# Patient Record
Sex: MALE | Race: BLACK OR AFRICAN AMERICAN | NOT HISPANIC OR LATINO | URBAN - METROPOLITAN AREA
[De-identification: names, ages, dates, MRNs, and addresses within clinical notes are randomized per-mention and may not be internally consistent; named-entity substitution may affect disease eponyms.]

---

## 2017-07-08 ENCOUNTER — EMERGENCY (EMERGENCY)
Facility: HOSPITAL | Age: 47
LOS: 1 days | Discharge: HOME | End: 2017-07-08

## 2018-01-15 NOTE — PROGRESS NOTES
Assessment    1  Encounter for preventive health examination (V70 0) (Z00 00)    Plan  Anxiety    · ClonazePAM 1 MG Oral Tablet; TAKE 1 TABLET TWICE DAILY AS NEEDED  Benign essential hypertension    · Lisinopril 2 5 MG Oral Tablet; take 1 tablet every day   · Metoprolol Succinate ER 25 MG Oral Tablet Extended Release 24 Hour; TAKE 1  TABLET ONCE DAILY  Insomnia    · Zolpidem Tartrate ER 12 5 MG Oral Tablet Extended Release; TAKE 1 TABLET  AT BEDTIME    Discussion/Summary  Impression: health maintenance visit  Currently, he eats a healthy diet  Prostate cancer screening: PSA is not indicated  Testicular cancer screening: monthly self testicular exam was advised  Colorectal cancer screening: colorectal cancer screening is not indicated  Advice and education were given regarding cardiovascular risk reduction and tobacco cessation  Patient discussion: discussed with the patient  Pt is to follow up in one year  He will send for records when he finds physician  The patent has the current Barriers: None  Patient is able to Self-Care  Possible side effects of new medications were reviewed with the patient/guardian today  The treatment plan was reviewed with the patient/guardian  The patient/guardian understands and agrees with the treatment plan   The patient was counseled regarding instructions for management, risk factor reductions, patient and family education, impressions, risks and benefits of treatment options, importance of compliance with treatment  Self Referrals: No      Chief Complaint  Well visit and work PE  History of Present Illness  HM, Adult Male: The patient is being seen for a health maintenance evaluation  The last health maintenance visit was 1 year(s) ago  Social History: Household members include parents  He is   Work status: working full time  The patient is a current cigarette smoker  He reports occasional alcohol use  He has never used illicit drugs     General Health: The patient's health since the last visit is described as good  He has regular dental visits  He complains of vision problems  Vision care includes wearing glasses and having regular eye examinations  He denies hearing loss  Immunizations status: up to date  Lifestyle:  He consumes a diverse and healthy diet  He does not have any weight concerns  He does not exercise regularly  He uses tobacco  He denies alcohol use  He denies drug use  Screening:   HPI: 42year old male is here for check up  He is not having any problems at this time  He is taking medications as directed and needs them renewed  He is going to be working in [place] and 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  Review of Systems    Constitutional: No fever or chills, feels well, no tiredness, no recent weight gain or weight loss  Eyes: No complaints of eye pain, no red eyes, no discharge from eyes, no itchy eyes  ENT: no complaints of earache, no hearing loss, no nosebleeds, no nasal discharge, no sore throat, no hoarseness  Cardiovascular: No complaints of slow heart rate, no fast heart rate, no chest pain, no palpitations, no leg claudication, no lower extremity  Respiratory: No complaints of shortness of breath, no wheezing, no cough, no SOB on exertion, no orthopnea or PND  Gastrointestinal: No complaints of abdominal pain, no constipation, no nausea or vomiting, no diarrhea or bloody stools  Genitourinary: No complaints of dysuria, no incontinence, no hesitancy, no nocturia, no genital lesion, no testicular pain  Musculoskeletal: No complaints of arthralgia, no myalgias, no joint swelling or stiffness, no limb pain or swelling  Integumentary: No complaints of skin rash or skin lesions, no itching, no skin wound, no dry skin  Neurological: No compliants of headache, no confusion, no convulsions, no numbness or tingling, no dizziness or fainting, no limb weakness, no difficulty walking  Psychiatric: as noted in HPI  Endocrine: No complaints of proptosis, no hot flashes, no muscle weakness, no erectile dysfunction, no deepening of the voice, no feelings of weakness  Hematologic/Lymphatic: No complaints of swollen glands, no swollen glands in the neck, does not bleed easily, no easy bruising  ROS reviewed  Active Problems    1  Anxiety (300 00) (F41 9)   2  Benign essential hypertension (401 1) (I10)   3  Degeneration, intervertebral disc, cervical (722 4) (M50 30)   4  Depression (311) (F32 9)   5  Insomnia (780 52) (G47 00)    Past Medical History    · History of Back pain (724 5) (M54 9)   · History of Chest pain (786 50) (R07 9)   · History of ECG (V15 89) (Z92 89)   · History of stress test (V15 89) (Z92 89)   · History of Muscle spasm (728 85) (R01 041)   · History of Pain in joint, shoulder region (719 41) (M25 519)   · History of Palpitations (785 1) (R00 2)    Surgical History    · History of Knee Surgery    Family History  Father    · Family history of Colon cancer  Grandfather    · Family history of Colon cancer   · Family history of Diabetes    Social History    · Denied: History of Alcohol use   · Denied: History of Drug use   · Heavy smoker (more than 20 cigarettes per day) (305 1) (F17 210)   · Tobacco use (305 1) (Z72 0)    Current Meds   1  ClonazePAM 1 MG Oral Tablet; TAKE 1 TABLET TWICE DAILY AS NEEDED; Therapy: 84XMS0980 to (Evaluate:25Xmn2236); Last Rx:[id number] Ordered   2  Lisinopril 2 5 MG Oral Tablet; take 1 tablet every day  Requested for: 35BPX0692; Last   Rx:[id number] Ordered   3  Metoprolol Succinate ER 25 MG Oral Tablet Extended Release 24 Hour; TAKE 1 TABLET   ONCE DAILY  Requested for: 78IBD6843; Last Rx:[id number] Ordered   4  Zolpidem Tartrate ER 12 5 MG Oral Tablet Extended Release; TAKE 1 TABLET AT   BEDTIME; Therapy: 65DXM0587 to (Tammy Lindquist)  Requested for: 98QBZ7846; Last   Rx:[id number] Ordered    Allergies    1   Penicillins    Vitals   Recorded: 70Dvr0835 10:38AM Recorded: 68Uzp5564 10:25AM   Heart Rate  84   Systolic 492 087   Diastolic 78 94   Height  5 ft 10 in   Weight  214 lb    BMI Calculated  30 71   BSA Calculated  2 15   O2 Saturation  97     Physical Exam    Constitutional   General appearance: No acute distress, well appearing and well nourished  Eyes   Conjunctiva and lids: No swelling, erythema, or discharge  Pupils and irises: Equal, round and reactive to light  Ears, Nose, Mouth, and Throat   External inspection of ears and nose: Normal     Otoscopic examination: Tympanic membrance translucent with normal light reflex  Canals patent without erythema  Oropharynx: Normal with no erythema, edema, exudate or lesions  Pulmonary   Respiratory effort: No increased work of breathing or signs of respiratory distress  Auscultation of lungs: Clear to auscultation  Cardiovascular   Palpation of heart: Normal PMI, no thrills  Auscultation of heart: Normal rate and rhythm, normal S1 and S2, without murmurs  Examination of extremities for edema and/or varicosities: Normal     Abdomen   Abdomen: Non-tender, no masses  Liver and spleen: No hepatomegaly or splenomegaly  Lymphatic   Palpation of lymph nodes in neck: No lymphadenopathy  Musculoskeletal   Gait and station: Normal     Digits and nails: Normal without clubbing or cyanosis  Inspection/palpation of joints, bones, and muscles: Normal     Skin   Skin and subcutaneous tissue: Abnormal   multiple tattoos  Neurologic   Cranial nerves: Cranial nerves 2-12 intact  Reflexes: 2+ and symmetric  Sensation: No sensory loss      Psychiatric   Orientation to person, place and time: Normal     Mood and affect: Normal        Signatures   Electronically signed by : LYNETTE Haq; Dec 20 2016 10:47AM EST                       (Author)    Electronically signed by : TRINITY Dowell ; Dec 20 2016 11:12AM EST

## 2019-06-08 ENCOUNTER — EMERGENCY (EMERGENCY)
Facility: HOSPITAL | Age: 49
LOS: 0 days | Discharge: HOME | End: 2019-06-08
Admitting: EMERGENCY MEDICINE
Payer: OTHER MISCELLANEOUS

## 2019-06-08 VITALS
HEIGHT: 70 IN | SYSTOLIC BLOOD PRESSURE: 155 MMHG | OXYGEN SATURATION: 99 % | DIASTOLIC BLOOD PRESSURE: 92 MMHG | WEIGHT: 220.02 LBS | RESPIRATION RATE: 17 BRPM | TEMPERATURE: 98 F | HEART RATE: 84 BPM

## 2019-06-08 DIAGNOSIS — I10 ESSENTIAL (PRIMARY) HYPERTENSION: ICD-10-CM

## 2019-06-08 DIAGNOSIS — Z20.6 CONTACT WITH AND (SUSPECTED) EXPOSURE TO HUMAN IMMUNODEFICIENCY VIRUS [HIV]: ICD-10-CM
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
## 2019-06-08 DIAGNOSIS — Z98.890 OTHER SPECIFIED POSTPROCEDURAL STATES: Chronic | ICD-10-CM
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
## 2019-06-08 DIAGNOSIS — Z79.899 OTHER LONG TERM (CURRENT) DRUG THERAPY: ICD-10-CM

## 2019-06-08 DIAGNOSIS — S61.230A PUNCTURE WOUND WITHOUT FOREIGN BODY OF RIGHT INDEX FINGER WITHOUT DAMAGE TO NAIL, INITIAL ENCOUNTER: ICD-10-CM

## 2019-06-08 DIAGNOSIS — Z88.0 ALLERGY STATUS TO PENICILLIN: ICD-10-CM
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
## 2019-06-08 PROCEDURE — 99284 EMERGENCY DEPT VISIT MOD MDM: CPT

## 2019-06-08 NOTE — ED PROVIDER NOTE - CARE PROVIDER_API CALL
Herbie Hernandez)  Infectious Disease; Internal Medicine  [address] [place] [address]  Phone: [phone]  Fax: [phone]  Follow Up Time:

## 2019-06-08 NOTE — ED PROVIDER NOTE - NSFOLLOWUPINSTRUCTIONS_ED_ALL_ED_FT
Needlestick Injury  A needlestick injury happens when a person is stuck with a needle that may have someone else's blood on it. A needlestick injury may expose you to blood that carries infections.    What are the causes?  This injury is caused by mishandling a needle. It can happen when you are:    Giving an injection.  Drawing blood.  Performing medical procedures with a needle or scalpel.  Handling or throwing away used needles (sharps).    What increases the risk?  This injury is most likely to happen to health care providers who give medicine by injection, draw blood, give stitches (sutures), or handle used needles. It is more likely to happen to health care providers who:    Work shifts that are longer than 8 hours.  Do not have experience or proper training in handling needles.  Feel pressure or a sense of urgency.  Are very tired.    What are the signs or symptoms?  Symptoms of this injury include:    Pain or irritation at the injury site.  Bleeding at the injury site.    How is this diagnosed?  This injury is diagnosed based on how the injury happened and a physical exam. Your health care provider may check the medical history of the person whose blood your were exposed to and test that person's blood.    How is this treated?  This injury is treated by cleaning the injured area right away with soap and water or an alcohol-based solution. Treatment may also involve:    Getting a tetanus booster shot. This shot may be given if you have not had a tetanus booster shot within the past 10 years.  Getting a hepatitis B vaccination.  Getting blood tests. These may be recommended for up to 6 months after the injury to rule out infection.  Taking medicines to prevent possible infection.    Follow these instructions at home:  Take over-the-counter and prescription medicines only as told by your health care provider.  Keep all follow-up visits as told by your health care provider. This is important.  Do not share personal hygiene items, such as razors and toothbrushes.  Contact a health care provider if:  You feel anxious, angry, or depressed.  You have difficulty sleeping.  Your skin or the whites of your eyes look yellow (jaundice).  You have belly pain or a feeling of fullness.  You have fatigue.  You feel generally sick (malaise).  You have frequent infections.  Get help right away if:  You have redness, swelling, or pain at the injury site.  You have fluid or blood coming from the injury site.  You have pus or a bad smell coming from the injury site.  Your skin feels warm to the touch.  You have a fever.

## 2019-06-08 NOTE — ED PROVIDER NOTE - OBJECTIVE STATEMENT
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

## 2019-06-08 NOTE — ED PROVIDER NOTE - NSFOLLOWUPCLINICS_GEN_ALL_ED_FT
[facility]  Infectious Disease  [address] [place] [id number]  Phone: [phone]  Fax:   Follow Up Time:

## 2019-06-08 NOTE — ED PROVIDER NOTE - PHYSICAL EXAMINATION
CONSTITUTIONAL: Well-appearing; well-nourished; in no apparent distress.   MS: No evidence of trauma or deformity. Normal ROM in all four extremities; non-tender to palpation; distal pulses are normal.   SKIN: +puncture wound right index finger; Normal for age and race; warm; dry; good turgor; no apparent lesions or exudate.   NEURO/PSYCH: A & O x 4; grossly unremarkable. mood and manner are appropriate. Grooming and personal hygiene are appropriate. No apparent thoughts of harm to self or others.

## 2019-06-08 NOTE — ED ADULT NURSE NOTE - OBJECTIVE STATEMENT
Pt c/o needle stick to right index finger from a dialysis needle 30 minutes ago. Denies any other symptoms.

## 2019-06-09 LAB
HAV IGM SER-ACNC: SIGNIFICANT CHANGE UP
HBV CORE IGM SER-ACNC: SIGNIFICANT CHANGE UP
HBV SURFACE AG SER-ACNC: SIGNIFICANT CHANGE UP
HCV AB S/CO SERPL IA: 0.13 S/CO — SIGNIFICANT CHANGE UP (ref 0–0.99)
HCV AB SERPL-IMP: SIGNIFICANT CHANGE UP
HIV 1+2 AB+HIV1 P24 AG SERPL QL IA: SIGNIFICANT CHANGE UP

## 2019-07-19 ENCOUNTER — INPATIENT (INPATIENT)
Facility: HOSPITAL | Age: 49
LOS: 4 days | Discharge: HOME | End: 2019-07-24
Attending: INTERNAL MEDICINE | Admitting: INTERNAL MEDICINE
Payer: COMMERCIAL

## 2019-07-19 VITALS
HEIGHT: 70 IN | SYSTOLIC BLOOD PRESSURE: 131 MMHG | WEIGHT: 199.96 LBS | TEMPERATURE: 101 F | HEART RATE: 87 BPM | RESPIRATION RATE: 18 BRPM | OXYGEN SATURATION: 98 % | DIASTOLIC BLOOD PRESSURE: 65 MMHG

## 2019-07-19 DIAGNOSIS — Z98.890 OTHER SPECIFIED POSTPROCEDURAL STATES: Chronic | ICD-10-CM

## 2019-07-19 DIAGNOSIS — R05 COUGH: ICD-10-CM

## 2019-07-19 LAB
ALBUMIN SERPL ELPH-MCNC: 3.8 G/DL — SIGNIFICANT CHANGE UP (ref 3.5–5.2)
ALP SERPL-CCNC: 102 U/L — SIGNIFICANT CHANGE UP (ref 30–115)
ALT FLD-CCNC: 58 U/L — HIGH (ref 0–41)
ANION GAP SERPL CALC-SCNC: 11 MMOL/L — SIGNIFICANT CHANGE UP (ref 7–14)
APPEARANCE UR: CLEAR — SIGNIFICANT CHANGE UP
APTT BLD: 31.3 SEC — SIGNIFICANT CHANGE UP (ref 27–39.2)
AST SERPL-CCNC: 47 U/L — HIGH (ref 0–41)
BASE EXCESS BLDV CALC-SCNC: 5.5 MMOL/L — HIGH (ref -2–2)
BILIRUB DIRECT SERPL-MCNC: 0.4 MG/DL — HIGH (ref 0–0.2)
BILIRUB INDIRECT FLD-MCNC: 0.8 MG/DL — SIGNIFICANT CHANGE UP (ref 0.2–1.2)
BILIRUB SERPL-MCNC: 1.2 MG/DL — SIGNIFICANT CHANGE UP (ref 0.2–1.2)
BILIRUB UR-MCNC: NEGATIVE — SIGNIFICANT CHANGE UP
BUN SERPL-MCNC: 16 MG/DL — SIGNIFICANT CHANGE UP (ref 10–20)
CA-I SERPL-SCNC: 1.1 MMOL/L — LOW (ref 1.12–1.3)
CALCIUM SERPL-MCNC: 8.4 MG/DL — LOW (ref 8.5–10.1)
CHLORIDE SERPL-SCNC: 93 MMOL/L — LOW (ref 98–110)
CO2 SERPL-SCNC: 27 MMOL/L — SIGNIFICANT CHANGE UP (ref 17–32)
COLOR SPEC: YELLOW — SIGNIFICANT CHANGE UP
CREAT SERPL-MCNC: 1.2 MG/DL — SIGNIFICANT CHANGE UP (ref 0.7–1.5)
DIFF PNL FLD: NEGATIVE — SIGNIFICANT CHANGE UP
GAS PNL BLDV: 130 MMOL/L — LOW (ref 136–145)
GAS PNL BLDV: SIGNIFICANT CHANGE UP
GLUCOSE SERPL-MCNC: 149 MG/DL — HIGH (ref 70–99)
GLUCOSE UR QL: NEGATIVE MG/DL — SIGNIFICANT CHANGE UP
HCO3 BLDV-SCNC: 31 MMOL/L — HIGH (ref 22–29)
HCT VFR BLD CALC: 44.3 % — SIGNIFICANT CHANGE UP (ref 42–52)
HCT VFR BLDA CALC: 58.8 % — HIGH (ref 34–44)
HGB BLD CALC-MCNC: 19.2 G/DL — HIGH (ref 14–18)
HGB BLD-MCNC: 15.2 G/DL — SIGNIFICANT CHANGE UP (ref 14–18)
HOROWITZ INDEX BLDV+IHG-RTO: 21 — SIGNIFICANT CHANGE UP
INR BLD: 1.27 RATIO — SIGNIFICANT CHANGE UP (ref 0.65–1.3)
KETONES UR-MCNC: NEGATIVE — SIGNIFICANT CHANGE UP
LACTATE BLDV-MCNC: 1 MMOL/L — SIGNIFICANT CHANGE UP (ref 0.5–1.6)
LEUKOCYTE ESTERASE UR-ACNC: NEGATIVE — SIGNIFICANT CHANGE UP
LIDOCAIN IGE QN: 19 U/L — SIGNIFICANT CHANGE UP (ref 7–60)
MCHC RBC-ENTMCNC: 31.3 PG — HIGH (ref 27–31)
MCHC RBC-ENTMCNC: 34.3 G/DL — SIGNIFICANT CHANGE UP (ref 32–37)
MCV RBC AUTO: 91.2 FL — SIGNIFICANT CHANGE UP (ref 80–94)
NITRITE UR-MCNC: NEGATIVE — SIGNIFICANT CHANGE UP
NRBC # BLD: 0 /100 WBCS — SIGNIFICANT CHANGE UP (ref 0–0)
NT-PROBNP SERPL-SCNC: 132 PG/ML — SIGNIFICANT CHANGE UP (ref 0–300)
PCO2 BLDV: 47 MMHG — SIGNIFICANT CHANGE UP (ref 41–51)
PH BLDV: 7.43 — SIGNIFICANT CHANGE UP (ref 7.26–7.43)
PH UR: 6.5 — SIGNIFICANT CHANGE UP (ref 5–8)
PLATELET # BLD AUTO: 158 K/UL — SIGNIFICANT CHANGE UP (ref 130–400)
PO2 BLDV: 23 MMHG — SIGNIFICANT CHANGE UP (ref 20–40)
POTASSIUM BLDV-SCNC: 4.4 MMOL/L — SIGNIFICANT CHANGE UP (ref 3.3–5.6)
POTASSIUM SERPL-MCNC: 4.4 MMOL/L — SIGNIFICANT CHANGE UP (ref 3.5–5)
POTASSIUM SERPL-SCNC: 4.4 MMOL/L — SIGNIFICANT CHANGE UP (ref 3.5–5)
PROT SERPL-MCNC: 6.4 G/DL — SIGNIFICANT CHANGE UP (ref 6–8)
PROT UR-MCNC: NEGATIVE MG/DL — SIGNIFICANT CHANGE UP
PROTHROM AB SERPL-ACNC: 14.6 SEC — HIGH (ref 9.95–12.87)
RBC # BLD: 4.86 M/UL — SIGNIFICANT CHANGE UP (ref 4.7–6.1)
RBC # FLD: 11.1 % — LOW (ref 11.5–14.5)
SAO2 % BLDV: 39 % — SIGNIFICANT CHANGE UP
SODIUM SERPL-SCNC: 131 MMOL/L — LOW (ref 135–146)
SP GR SPEC: <=1.005 — SIGNIFICANT CHANGE UP (ref 1.01–1.03)
TROPONIN T SERPL-MCNC: <0.01 NG/ML — SIGNIFICANT CHANGE UP
UROBILINOGEN FLD QL: 2 MG/DL (ref 0.2–0.2)
WBC # BLD: 8.35 K/UL — SIGNIFICANT CHANGE UP (ref 4.8–10.8)
WBC # FLD AUTO: 8.35 K/UL — SIGNIFICANT CHANGE UP (ref 4.8–10.8)

## 2019-07-19 PROCEDURE — 76705 ECHO EXAM OF ABDOMEN: CPT | Mod: 26

## 2019-07-19 PROCEDURE — 99285 EMERGENCY DEPT VISIT HI MDM: CPT

## 2019-07-19 PROCEDURE — 71045 X-RAY EXAM CHEST 1 VIEW: CPT | Mod: 26

## 2019-07-19 PROCEDURE — 71275 CT ANGIOGRAPHY CHEST: CPT | Mod: 26

## 2019-07-19 PROCEDURE — 99223 1ST HOSP IP/OBS HIGH 75: CPT

## 2019-07-19 RX ORDER — CEFOTETAN DISODIUM 1 G
2 VIAL (EA) INJECTION EVERY 12 HOURS
Refills: 0 | Status: DISCONTINUED | OUTPATIENT
Start: 2019-07-19 | End: 2019-07-20

## 2019-07-19 RX ORDER — MORPHINE SULFATE 50 MG/1
4 CAPSULE, EXTENDED RELEASE ORAL ONCE
Refills: 0 | Status: DISCONTINUED | OUTPATIENT
Start: 2019-07-19 | End: 2019-07-19

## 2019-07-19 RX ORDER — LISINOPRIL 2.5 MG/1
1 TABLET ORAL
Qty: 0 | Refills: 0 | DISCHARGE

## 2019-07-19 RX ORDER — SODIUM CHLORIDE 9 MG/ML
1000 INJECTION, SOLUTION INTRAVENOUS ONCE
Refills: 0 | Status: COMPLETED | OUTPATIENT
Start: 2019-07-19 | End: 2019-07-19

## 2019-07-19 RX ORDER — ACETAMINOPHEN 500 MG
650 TABLET ORAL ONCE
Refills: 0 | Status: COMPLETED | OUTPATIENT
Start: 2019-07-19 | End: 2019-07-19

## 2019-07-19 RX ADMIN — Medication 650 MILLIGRAM(S): at 17:47

## 2019-07-19 RX ADMIN — Medication 100 GRAM(S): at 19:52

## 2019-07-19 RX ADMIN — MORPHINE SULFATE 4 MILLIGRAM(S): 50 CAPSULE, EXTENDED RELEASE ORAL at 17:47

## 2019-07-19 RX ADMIN — SODIUM CHLORIDE 1000 MILLILITER(S): 9 INJECTION, SOLUTION INTRAVENOUS at 17:00

## 2019-07-19 RX ADMIN — Medication 650 MILLIGRAM(S): at 17:00

## 2019-07-19 RX ADMIN — MORPHINE SULFATE 4 MILLIGRAM(S): 50 CAPSULE, EXTENDED RELEASE ORAL at 17:00

## 2019-07-19 NOTE — ED PROVIDER NOTE - PROGRESS NOTE DETAILS
Discussed with Dr. Sellers, will follow labs and imaging and reassess spoke with ID, side effects of medications would include hepatitis and lactic acidosis however patient's AST/ALT normal. pt feels better than on arrival but still with pain and feels weak, does have mild lft elevation, RUQ sono appreciated, unremarkable, will send cultures and admit, stable for floor

## 2019-07-19 NOTE — ED PROVIDER NOTE - NS ED ROS FT
Review of Systems         Constitutional: (-) fever (-) chills (-) weakness       EENT: (-) visual changes (-) sore throat       Cardiovascular: (-) chest pain (-) syncope       Respiratory: (-) cough, (+) shortness of breath       Gastrointestinal: (+) abdominal pain (-) vomiting (-) diarrhea (-) nausea (-) constipation       Genitourinary: (-) dysuria       Musculoskeletal: (-) neck pain (-) back pain (-) joint pain       Integumentary: (-) rash       Neurological: (-) headache (-) altered mental status (-) dizziness (-) paresthesias       Psych: (-) psych history

## 2019-07-19 NOTE — ED ADULT NURSE NOTE - NSIMPLEMENTINTERV_GEN_ALL_ED
Implemented All Universal Safety Interventions:  [place] to call system. Call bell, personal items and telephone within reach. Instruct patient to call for assistance. Room bathroom lighting operational. Non-slip footwear when patient is off stretcher. Physically safe environment: no spills, clutter or unnecessary equipment. Stretcher in lowest position, wheels locked, appropriate side rails in place.

## 2019-07-19 NOTE — H&P ADULT - NSHPPOAPULMEMBOLUS_GEN_A_CORE
What Type Of Note Output Would You Prefer (Optional)?: Standard Output How Severe Is Your Skin Lesion?: mild Is This A New Presentation, Or A Follow-Up?: Skin Lesion no

## 2019-07-19 NOTE — ED PROVIDER NOTE - PHYSICAL EXAMINATION
Physical Exam    Vital Signs: I have reviewed the initial vital signs  Constitutional: diaphoretic, clammy, sick appearing, anxious, pale, sitting still  EENT: Conjunctiva pink, Sclera clear, PERRLA, EOMI. Mucous membranes moist, no exudates or lesions noted, uvula midline.   Cardiovascular: S1 and S2 present, tachycardic, regular rhythm. Well perfused extremities, no peripheral edema  Respiratory: unlabored respiratory effort, clear to auscultation bilaterally no wheezing, rales and rhonchi  Gastrointestinal: extremely TTP in RUQ and above umbilicus  Musculoskeletal: supple nontender neck, no midline tenderness, no joint pain  Integumentary: warm, dry, no rash  Psychiatric: appropriate mood, appropriate affect

## 2019-07-19 NOTE — ED PROVIDER NOTE - ATTENDING CONTRIBUTION TO CARE
48 year old male hx HTN presenting with 1 day of abdominal pain, dull, RUQ and RLQ, non radiating, comes and goes, associated with fever, chills and diaphoresis, no cp/sob, patient arrived to the ED holding abdomen in pain.     CONSTITUTIONAL: Well-developed; well-nourished; + Diaphoresis, + subjective fever, + tactile fever, in no acute distress. Sitting up and providing appropriate history and physical examination  SKIN: skin exam is warm and dry, no acute rash.  HEAD: Normocephalic; atraumatic.  EYES: PERRL, 3 mm bilateral, no nystagmus, EOM intact; conjunctiva and sclera clear.  ENT: No nasal discharge; airway clear.  NECK: Supple; non tender. + full passive ROM in all directions. No JVD  CARD: S1, S2 normal; no murmurs, gallops, or rubs. Regular rate and rhythm. + Symmetric Strong Pulses  RESP: No wheezes, rales or rhonchi. Good air movement bilaterally  ABD: soft; non-distended; + RLQ and RUQ tenderness, + [place], bedside US failed to display free fluid, FAST negative, No Rebound, No Guarding, No signs of peritonitis, No CVA tenderness. No pulsatile abdominal mass. + Strong and Symmetric Pulses  EXT: Normal ROM. No clubbing, cyanosis or edema. Dp and Pt Pulses intact. Cap refill less than 3 seconds  NEURO: Alert, oriented, grossly unremarkable. No Focal deficits. GCS 15. NIH 0  PSYCH: Cooperative, appropriate.

## 2019-07-19 NOTE — H&P ADULT - NSHPREVIEWOFSYSTEMS_GEN_ALL_CORE
· Review of Systems: Review of Systems    	     Constitutional: (-) fever (-) chills (-) weakness  	     EENT: (-) visual changes (-) sore throat  	     Cardiovascular: (-) chest pain (-) syncope  	     Respiratory: (-) cough, (+) shortness of breath  	     Gastrointestinal: (+) abdominal pain (-) vomiting (-) diarrhea (-) nausea (-) constipation  	     Genitourinary: (-) dysuria  	     Musculoskeletal: (-) neck pain (-) back pain (-) joint pain  	     Integumentary: (-) rash  	     Neurological: (-) headache (-) altered mental status (-) dizziness (-) paresthesias       Psych: (-) psych history

## 2019-07-19 NOTE — H&P ADULT - HISTORY OF PRESENT ILLNESS
· HPI Objective Statement: 48 year old male hx HTN presenting with 1 day of abdominal pain. Patient states he feels shortness of breath and has abdominal pain, fevers since sunday (tmax of 103), diaphoresis. Pain ranges from supra-umbilical, and RUQ. Bedside U/s revealed normal gallbladder. 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 He denies chest pain, vomiting, nausea, headache, back pain, neck pain.

## 2019-07-19 NOTE — ED ADULT NURSE NOTE - CHIEF COMPLAINT QUOTE
pt sent from [facility], diaphoretic, c/o epigastric/ruq abd pain, pain increases on palpation. pt with fever of 103 at [facility] and given motrin prior to coming to er.

## 2019-07-19 NOTE — ED ADULT TRIAGE NOTE - CHIEF COMPLAINT QUOTE
pt sent from [facility], diaphoretic, c/o epigastric/ruq abd pain, pain increases on palpation. pt with fever of 103 at [facility] and given motrin prior to coming to er.

## 2019-07-19 NOTE — ED PROVIDER NOTE - CLINICAL SUMMARY MEDICAL DECISION MAKING FREE TEXT BOX
Pt with severe abd pain and fevers Tm 103 fo 5d, has had 20lb weight loss since starting PEP after HIV+/HCV+ needlestick, finished PEP, no recent travel or sick contacts, on exam apparently very ill appearing on arrival/concern for dissection, no dissection, labs and studies reviewed, pt feels better but still weak, source of fever unclear, will admit, stable for floor

## 2019-07-19 NOTE — ED PROVIDER NOTE - OBJECTIVE STATEMENT
48 year old male hx HTN presenting with 1 day of abdominal pain. Patient states he feels shortness of breath and has abdominal pain, fevers, diaphoresis. Pain ranges from supra-umbilical, and RUQ. Bedside U/s revealed normal gallbladder. 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 He denies chest pain, vomiting, nausea, headache, back pain, neck pain. 48 year old male hx HTN presenting with 1 day of abdominal pain. Patient states he feels shortness of breath and has abdominal pain, fevers since sunday (tmax of 103), diaphoresis. Pain ranges from supra-umbilical, and RUQ. Bedside U/s revealed normal gallbladder. 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 He denies chest pain, vomiting, nausea, headache, back pain, neck pain.

## 2019-07-19 NOTE — H&P ADULT - NSHPLABSRESULTS_GEN_ALL_CORE
15.2   8.35  )-----------( 158      ( 19 Jul 2019 16:50 )             44.3     07-19    131<L>  |  93<L>  |  16  ----------------------------<  149<H>  4.4   |  27  |  1.2    Ca    8.4<L>      19 Jul 2019 16:50    TPro  6.4  /  Alb  3.8  /  TBili  1.2  /  DBili  0.4<H>  /  AST  47<H>  /  ALT  58<H>  /  AlkPhos  102  07-19          Urinalysis Basic - ( 19 Jul 2019 22:12 )    Color: Yellow / Appearance: Clear / SG: <=1.005 / pH: x  Gluc: x / Ketone: Negative  / Bili: Negative / Urobili: 2.0 mg/dL   Blood: x / Protein: Negative mg/dL / Nitrite: Negative   Leuk Esterase: Negative / RBC: x / WBC x   Sq Epi: x / Non Sq Epi: x / Bacteria: x      PT/INR - ( 19 Jul 2019 16:50 )   PT: 14.60 sec;   INR: 1.27 ratio         PTT - ( 19 Jul 2019 16:50 )  PTT:31.3 sec  Lactate Trend    CARDIAC MARKERS ( 19 Jul 2019 16:50 )  x     / <0.01 ng/mL / x     / x     / x          CAPILLARY BLOOD GLUCOSE

## 2019-07-19 NOTE — H&P ADULT - NEUROLOGICAL DETAILS
cranial nerves intact/superficial reflexes intact/normal strength/alert and oriented x 3/deep reflexes intact

## 2019-07-20 DIAGNOSIS — I10 ESSENTIAL (PRIMARY) HYPERTENSION: ICD-10-CM

## 2019-07-20 DIAGNOSIS — R10.13 EPIGASTRIC PAIN: ICD-10-CM

## 2019-07-20 DIAGNOSIS — R50.9 FEVER, UNSPECIFIED: ICD-10-CM

## 2019-07-20 LAB
ANION GAP SERPL CALC-SCNC: 10 MMOL/L — SIGNIFICANT CHANGE UP (ref 7–14)
BUN SERPL-MCNC: 14 MG/DL — SIGNIFICANT CHANGE UP (ref 10–20)
CALCIUM SERPL-MCNC: 7.8 MG/DL — LOW (ref 8.5–10.1)
CHLORIDE SERPL-SCNC: 99 MMOL/L — SIGNIFICANT CHANGE UP (ref 98–110)
CO2 SERPL-SCNC: 28 MMOL/L — SIGNIFICANT CHANGE UP (ref 17–32)
CREAT SERPL-MCNC: 1 MG/DL — SIGNIFICANT CHANGE UP (ref 0.7–1.5)
E COLI DNA BLD POS QL NAA+NON-PROBE: SIGNIFICANT CHANGE UP
GLUCOSE SERPL-MCNC: 108 MG/DL — HIGH (ref 70–99)
GRAM STN FLD: SIGNIFICANT CHANGE UP
HCT VFR BLD CALC: 39 % — LOW (ref 42–52)
HGB BLD-MCNC: 13.3 G/DL — LOW (ref 14–18)
MCHC RBC-ENTMCNC: 30.8 PG — SIGNIFICANT CHANGE UP (ref 27–31)
MCHC RBC-ENTMCNC: 34.1 G/DL — SIGNIFICANT CHANGE UP (ref 32–37)
MCV RBC AUTO: 90.3 FL — SIGNIFICANT CHANGE UP (ref 80–94)
METHOD TYPE: SIGNIFICANT CHANGE UP
NRBC # BLD: 0 /100 WBCS — SIGNIFICANT CHANGE UP (ref 0–0)
PLATELET # BLD AUTO: 144 K/UL — SIGNIFICANT CHANGE UP (ref 130–400)
POTASSIUM SERPL-MCNC: 4 MMOL/L — SIGNIFICANT CHANGE UP (ref 3.5–5)
POTASSIUM SERPL-SCNC: 4 MMOL/L — SIGNIFICANT CHANGE UP (ref 3.5–5)
RBC # BLD: 4.32 M/UL — LOW (ref 4.7–6.1)
RBC # FLD: 11.3 % — LOW (ref 11.5–14.5)
SODIUM SERPL-SCNC: 137 MMOL/L — SIGNIFICANT CHANGE UP (ref 135–146)
SPECIMEN SOURCE: SIGNIFICANT CHANGE UP
SPECIMEN SOURCE: SIGNIFICANT CHANGE UP
WBC # BLD: 5.55 K/UL — SIGNIFICANT CHANGE UP (ref 4.8–10.8)
WBC # FLD AUTO: 5.55 K/UL — SIGNIFICANT CHANGE UP (ref 4.8–10.8)

## 2019-07-20 PROCEDURE — 99233 SBSQ HOSP IP/OBS HIGH 50: CPT

## 2019-07-20 RX ORDER — LISINOPRIL 2.5 MG/1
5 TABLET ORAL DAILY
Refills: 0 | Status: DISCONTINUED | OUTPATIENT
Start: 2019-07-20 | End: 2019-07-24

## 2019-07-20 RX ORDER — ONDANSETRON 8 MG/1
4 TABLET, FILM COATED ORAL ONCE
Refills: 0 | Status: COMPLETED | OUTPATIENT
Start: 2019-07-20 | End: 2019-07-20

## 2019-07-20 RX ORDER — CEFTRIAXONE 500 MG/1
2000 INJECTION, POWDER, FOR SOLUTION INTRAMUSCULAR; INTRAVENOUS EVERY 24 HOURS
Refills: 0 | Status: DISCONTINUED | OUTPATIENT
Start: 2019-07-20 | End: 2019-07-23

## 2019-07-20 RX ORDER — ACETAMINOPHEN 500 MG
650 TABLET ORAL EVERY 6 HOURS
Refills: 0 | Status: DISCONTINUED | OUTPATIENT
Start: 2019-07-20 | End: 2019-07-24

## 2019-07-20 RX ORDER — PANTOPRAZOLE SODIUM 20 MG/1
40 TABLET, DELAYED RELEASE ORAL DAILY
Refills: 0 | Status: DISCONTINUED | OUTPATIENT
Start: 2019-07-20 | End: 2019-07-24

## 2019-07-20 RX ORDER — HEPARIN SODIUM 5000 [USP'U]/ML
5000 INJECTION INTRAVENOUS; SUBCUTANEOUS EVERY 12 HOURS
Refills: 0 | Status: DISCONTINUED | OUTPATIENT
Start: 2019-07-20 | End: 2019-07-20

## 2019-07-20 RX ORDER — MORPHINE SULFATE 50 MG/1
4 CAPSULE, EXTENDED RELEASE ORAL EVERY 4 HOURS
Refills: 0 | Status: DISCONTINUED | OUTPATIENT
Start: 2019-07-20 | End: 2019-07-24

## 2019-07-20 RX ORDER — SODIUM CHLORIDE 9 MG/ML
1000 INJECTION, SOLUTION INTRAVENOUS
Refills: 0 | Status: DISCONTINUED | OUTPATIENT
Start: 2019-07-20 | End: 2019-07-24

## 2019-07-20 RX ORDER — TRAZODONE HCL 50 MG
50 TABLET ORAL ONCE
Refills: 0 | Status: COMPLETED | OUTPATIENT
Start: 2019-07-20 | End: 2019-07-20

## 2019-07-20 RX ORDER — HEPARIN SODIUM 5000 [USP'U]/ML
5000 INJECTION INTRAVENOUS; SUBCUTANEOUS EVERY 12 HOURS
Refills: 0 | Status: DISCONTINUED | OUTPATIENT
Start: 2019-07-20 | End: 2019-07-24

## 2019-07-20 RX ORDER — METRONIDAZOLE 500 MG
500 TABLET ORAL EVERY 8 HOURS
Refills: 0 | Status: DISCONTINUED | OUTPATIENT
Start: 2019-07-20 | End: 2019-07-23

## 2019-07-20 RX ADMIN — Medication 650 MILLIGRAM(S): at 13:46

## 2019-07-20 RX ADMIN — Medication 100 MILLIGRAM(S): at 21:14

## 2019-07-20 RX ADMIN — Medication 100 MILLIGRAM(S): at 22:02

## 2019-07-20 RX ADMIN — MORPHINE SULFATE 4 MILLIGRAM(S): 50 CAPSULE, EXTENDED RELEASE ORAL at 15:00

## 2019-07-20 RX ADMIN — SODIUM CHLORIDE 100 MILLILITER(S): 9 INJECTION, SOLUTION INTRAVENOUS at 12:20

## 2019-07-20 RX ADMIN — PANTOPRAZOLE SODIUM 40 MILLIGRAM(S): 20 TABLET, DELAYED RELEASE ORAL at 12:17

## 2019-07-20 RX ADMIN — SODIUM CHLORIDE 100 MILLILITER(S): 9 INJECTION, SOLUTION INTRAVENOUS at 21:15

## 2019-07-20 RX ADMIN — ONDANSETRON 4 MILLIGRAM(S): 8 TABLET, FILM COATED ORAL at 19:24

## 2019-07-20 RX ADMIN — Medication 100 GRAM(S): at 06:32

## 2019-07-20 RX ADMIN — MORPHINE SULFATE 4 MILLIGRAM(S): 50 CAPSULE, EXTENDED RELEASE ORAL at 13:54

## 2019-07-20 RX ADMIN — Medication 50 MILLIGRAM(S): at 02:00

## 2019-07-20 RX ADMIN — Medication 100 MILLIGRAM(S): at 10:35

## 2019-07-20 RX ADMIN — Medication 100 MILLIGRAM(S): at 02:00

## 2019-07-20 RX ADMIN — SODIUM CHLORIDE 100 MILLILITER(S): 9 INJECTION, SOLUTION INTRAVENOUS at 01:00

## 2019-07-20 RX ADMIN — HEPARIN SODIUM 5000 UNIT(S): 5000 INJECTION INTRAVENOUS; SUBCUTANEOUS at 18:16

## 2019-07-20 RX ADMIN — LISINOPRIL 5 MILLIGRAM(S): 2.5 TABLET ORAL at 06:33

## 2019-07-20 RX ADMIN — MORPHINE SULFATE 4 MILLIGRAM(S): 50 CAPSULE, EXTENDED RELEASE ORAL at 22:02

## 2019-07-20 RX ADMIN — Medication 100 MILLIGRAM(S): at 15:04

## 2019-07-20 RX ADMIN — MORPHINE SULFATE 4 MILLIGRAM(S): 50 CAPSULE, EXTENDED RELEASE ORAL at 06:53

## 2019-07-20 RX ADMIN — HEPARIN SODIUM 5000 UNIT(S): 5000 INJECTION INTRAVENOUS; SUBCUTANEOUS at 06:33

## 2019-07-20 NOTE — CONSULT NOTE ADULT - SUBJECTIVE AND OBJECTIVE BOX
SARATHJAMAL  48y, Male  Allergy: penicillins (Unknown)      HPI:  · HPI Objective Statement: 48 year old male hx HTN presenting with 1 day of abdominal pain. Patient states he feels shortness of breath and has abdominal pain, fevers since 7/14 (tmax of 103) with associated nausea/ diarrhea. No overt abdominal pain. No BRBPR/melena. No /pulmonary complaints  Works in [employer].  Stuck himself with a needle form an HIV positive pt.  Started Truvada/ Raltegravir which he tolerated for a week and then changed to Biktarvy for 2 more weeks only ( was further not approve d by his insurance carrier ).  Has been off prophylactic therapy since at least beginning of July  No recent travels  No unusual hobbies        FAMILY HISTORY:    PAST MEDICAL & SURGICAL HISTORY:  HTN (hypertension)  H/O left knee surgery        VITALS:  T(F): 100.1, Max: 101.2 (07-20-19 @ 02:19)  HR: 77  BP: 120/65  RR: 18Vital Signs Last 24 Hrs  T(C): 37.8 (20 Jul 2019 11:35), Max: 38.4 (19 Jul 2019 16:09)  T(F): 100.1 (20 Jul 2019 11:35), Max: 101.2 (20 Jul 2019 02:19)  HR: 77 (20 Jul 2019 05:21) (77 - 87)  BP: 120/65 (20 Jul 2019 05:21) (119/66 - 131/65)  BP(mean): --  RR: 18 (20 Jul 2019 05:21) (18 - 18)  SpO2: 97% (20 Jul 2019 07:58) (97% - 99%)    TESTS & MEASUREMENTS:                        13.3   5.55  )-----------( 144      ( 20 Jul 2019 06:51 )             39.0     07-20    137  |  99  |  14  ----------------------------<  108<H>  4.0   |  28  |  1.0    Ca    7.8<L>      20 Jul 2019 06:51    TPro  6.4  /  Alb  3.8  /  TBili  1.2  /  DBili  0.4<H>  /  AST  47<H>  /  ALT  58<H>  /  AlkPhos  102  07-19    LIVER FUNCTIONS - ( 19 Jul 2019 16:50 )  Alb: 3.8 g/dL / Pro: 6.4 g/dL / ALK PHOS: 102 U/L / ALT: 58 U/L / AST: 47 U/L / GGT: x             Culture - Blood (collected 07-19-19 @ 16:50)  Source: .Blood Blood  Gram Stain (07-20-19 @ 10:47):    Growth in aerobic bottle: Gram Negative Rods    Growth in anaerobic bottle: Gram Negative Rods  Preliminary Report (07-20-19 @ 10:47):    Growth in aerobic bottle: Gram Negative Rods    Growth in anaerobic bottle: Gram Negative Rods    "Due to technical problems, Proteus sp. will Not be reported as part of    the BCID panel until further notice"    ***Blood Panel PCR results on this specimenare available    approximately 3 hours after the Gram stain result.***    Gram stain, PCR, and/or culture results may not always    correspond due to difference in methodologies.    ************************************************************    This PCR assaywas performed using [employer].    The following targets are tested for: Enterococcus,    vancomycin resistant enterococci, Listeria monocytogenes,    coagulase negative staphylococci, S. aureus,    methicillin resistant S. aureus, Streptococcus agalactiae    (Group B), S. pneumoniae, S. pyogenes (Group A),    Acinetobacter baumannii, Enterobacter cloacae, E. coli,    Klebsiella oxytoca, K. pneumoniae, Proteus sp.,    Serratia marcescens, Haemophilus influenzae,    Neisseria meningitidis, Pseudomonas aeruginosa, Candida    albicans, C. glabrata, C krusei, C parapsilosis,    C. tropicalis and the KPC resistance gene.  Organism: Blood Culture PCR (07-20-19 @ 11:31)  Organism: Blood Culture PCR (07-20-19 @ 11:31)      -  Escherichia coli: Detec      Method Type: PCR    Culture - Blood (collected 07-19-19 @ 16:50)  Source: .Blood Blood  Gram Stain (07-20-19 @ 10:48):    Growth in anaerobic bottle: Gram Negative Rods    Growth in aerobic bottle: Gram Negative Rods  Preliminary Report (07-20-19 @ 10:48):    Growth in anaerobic bottle: Gram Negative Rods    Growth in aerobic bottle: Gram Negative Rods      Urinalysis Basic - ( 19 Jul 2019 22:12 )    Color: Yellow / Appearance: Clear / SG: <=1.005 / pH: x  Gluc: x / Ketone: Negative  / Bili: Negative / Urobili: 2.0 mg/dL   Blood: x / Protein: Negative mg/dL / Nitrite: Negative   Leuk Esterase: Negative / RBC: x / WBC x   Sq Epi: x / Non Sq Epi: x / Bacteria: x          RADIOLOGY & ADDITIONAL TESTS:    ANTIBIOTICS:

## 2019-07-20 NOTE — MEDICAL STUDENT PROGRESS NOTE(EDUCATION) - NS MD HP STUD SUPERVISOR COMMENTS FT
Patient was seen independently. Latest vital signs and labs were reviewed. Case was discussed with medical student in morning rounds.  Agree with resident's assessment & plan which was reviewed by me and modified where necessary.

## 2019-07-20 NOTE — MEDICAL STUDENT PROGRESS NOTE(EDUCATION) - SUBJECTIVE AND OBJECTIVE BOX
SARATHJAMAL  48y, Male  Allergy: penicillins (Unknown)      HPI:  · HPI Objective Statement: 48 year old male hx HTN presenting with 1 day of abdominal pain. Patient states he feels shortness of breath and has abdominal pain, fevers since sunday (tmax of 103), diaphoresis. Pain ranges from supra-umbilical, and RUQ. Bedside U/s revealed normal gallbladder. 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 He denies chest pain, vomiting, nausea, headache, back pain, neck pain. (19 Jul 2019 23:21)      OVERNIGHT EVENTS:  Patient was seen and evaluated at bedside. Patient discussed further about his history of present illness. He stated that on the day of his needle stick on 6-8-19, he went to [facility] and was prescribed Emtricitabine-Tenofovir and Raltegravir for 1 week for PEP. Afterwards, he was prescribed Biktarvy for 4 weeks however only took it for 2 weeks due to a delay in approval from his worker's comp for the second half of PEP. While on PEP, he experienced frequent diarrhea and vomiting and lost 20 lbs. 1 week ago, he started experiencing fevers with a Tmax of 103F, chills, and weakness. He also admitted to a dry cough for the last couple of days and a new episode of non bloody, watery diarrhea and vomiting that began yesterday. Today, patient felt afebrile however he had a fever of 101.2F last night.       PAST MEDICAL & SURGICAL HISTORY:  HTN (hypertension)  H/O left knee surgery      VITALS:  T(F): 98.1 (07-20-19 @ 05:21), Max: 101.2 (07-20-19 @ 02:19)  HR: 77 (07-20-19 @ 05:21)  BP: 120/65 (07-20-19 @ 05:21) (119/66 - 131/65)  RR: 18 (07-20-19 @ 05:21)  SpO2: 97% (07-20-19 @ 07:58)    General: WN/WD NAD  Neurology: A&Ox3, nonfocal  Eyes: PERRLA/ EOMI, Gross vision intact  ENT/Neck: Neck supple, trachea midline, No JVD, Gross hearing intact, + submandibular lymphadenopathy L>R  Respiratory: CTA B/L, No wheezing, rales, rhonchi  CV: RRR, S1S2, no murmurs, rubs or gallops  Abdominal: Soft, ND +BS, tender to palpation in bilateral upper quadrants, midline ventral hernia seen near umbilicus   Extremities: No edema, + peripheral pulses  Skin: No Rashes, Hematoma, Ecchymosis      TESTS & MEASUREMENTS:  Height (cm): 177.8 (07-20-19 @ 00:56)  Weight (kg): 96.4 (07-20-19 @ 00:56)  BMI (kg/m2): 30.5 (07-20-19 @ 00:56)                          13.3   5.55  )-----------( 144      ( 20 Jul 2019 06:51 )             39.0     PT/INR - ( 19 Jul 2019 16:50 )   PT: 14.60 sec;   INR: 1.27 ratio         PTT - ( 19 Jul 2019 16:50 )  PTT:31.3 sec  07-20    137  |  99  |  14  ----------------------------<  108<H>  4.0   |  28  |  1.0    Ca    7.8<L>      20 Jul 2019 06:51    TPro  6.4  /  Alb  3.8  /  TBili  1.2  /  DBili  0.4<H>  /  AST  47<H>  /  ALT  58<H>  /  AlkPhos  102  07-19    LIVER FUNCTIONS - ( 19 Jul 2019 16:50 )  Alb: 3.8 g/dL / Pro: 6.4 g/dL / ALK PHOS: 102 U/L / ALT: 58 U/L / AST: 47 U/L / GGT: x           CARDIAC MARKERS ( 19 Jul 2019 16:50 )  x     / <0.01 ng/mL / x     / x     / x          Culture - Blood (collected 07-19-19 @ 16:50)  Source: .Blood Blood  Gram Stain (07-20-19 @ 10:47):    Growth in aerobic bottle: Gram Negative Rods    Growth in anaerobic bottle: Gram Negative Rods  Preliminary Report (07-20-19 @ 10:47):    Growth in aerobic bottle: Gram Negative Rods    Growth in anaerobic bottle: Gram Negative Rods    "Due to technical problems, Proteus sp. will Not be reported as part of    the BCID panel until further notice"    ***Blood Panel PCR results on this specimenare available    approximately 3 hours after the Gram stain result.***    Gram stain, PCR, and/or culture results may not always    correspond due to difference in methodologies.    ************************************************************    This PCR assaywas performed using [employer].    The following targets are tested for: Enterococcus,    vancomycin resistant enterococci, Listeria monocytogenes,    coagulase negative staphylococci, S. aureus,    methicillin resistant S. aureus, Streptococcus agalactiae    (Group B), S. pneumoniae, S. pyogenes (Group A),    Acinetobacter baumannii, Enterobacter cloacae, E. coli,    Klebsiella oxytoca, K. pneumoniae, Proteus sp.,    Serratia marcescens, Haemophilus influenzae,    Neisseria meningitidis, Pseudomonas aeruginosa, Candida    albicans, C. glabrata, C krusei, C parapsilosis,    C. tropicalis and the KPC resistance gene.    Culture - Blood (collected 07-19-19 @ 16:50)  Source: .Blood Blood  Gram Stain (07-20-19 @ 10:48):    Growth in anaerobic bottle: Gram Negative Rods    Growth in aerobic bottle: Gram Negative Rods  Preliminary Report (07-20-19 @ 10:48):    Growth in anaerobic bottle: Gram Negative Rods    Growth in aerobic bottle: Gram Negative Rods      Urinalysis Basic - ( 19 Jul 2019 22:12 )    Color: Yellow / Appearance: Clear / SG: <=1.005 / pH: x  Gluc: x / Ketone: Negative  / Bili: Negative / Urobili: 2.0 mg/dL   Blood: x / Protein: Negative mg/dL / Nitrite: Negative   Leuk Esterase: Negative / RBC: x / WBC x   Sq Epi: x / Non Sq Epi: x / Bacteria: x      RADIOLOGY & ADDITIONAL TESTS:  < from: US Abdomen Limited (07.19.19 @ 21:33) >  IMPRESSION:      Mild dilation of the common bile duct up to 7 mm.    Otherwise, essentially unremarkable right upper quadrant ultrasound.    < end of copied text >    < from: CT Angio Chest Dissection Protocol (07.19.19 @ 18:27) >  IMPRESSION:    1.  No aortic dissection.    2.  No evidence of any acute inflammatory process within the chest,   abdomen, or pelvis.    3.  Nonspecific dilatation of the main pulmonary trunk to 3.5 cm.    < end of copied text >    < from: Xray Chest 1 View-PORTABLE IMMEDIATE (07.19.19 @ 16:50) >  Impression:      No radiographic evidence of acute cardiopulmonary disease.    < end of copied text >      MEDICATIONS:  MEDICATIONS  (STANDING):  cefoTEtan  IVPB 2 Gram(s) IV Intermittent every 12 hours  dextrose 5% + sodium chloride 0.9%. 1000 milliLiter(s) (100 mL/Hr) IV Continuous <Continuous>  heparin  Injectable 5000 Unit(s) SubCutaneous every 12 hours  lisinopril 5 milliGRAM(s) Oral daily  pantoprazole  Injectable 40 milliGRAM(s) IV Push daily    MEDICATIONS  (PRN):  acetaminophen   Tablet .. 650 milliGRAM(s) Oral every 6 hours PRN Temp greater or equal to 38C (100.4F)  guaiFENesin    Syrup 100 milliGRAM(s) Oral every 6 hours PRN Cough  morphine  - Injectable 4 milliGRAM(s) IV Push every 4 hours PRN Severe Pain (7 - 10)      HEALTH ISSUES - PROBLEM Dx:  Epigastric pain: Epigastric pain  HTN (hypertension): HTN (hypertension)  Fever, unspecified fever cause: Fever, unspecified fever cause      Case discussed in details with: Dr. Joseph Velázquez     PRESSURE ULCERS                                                 NO  URETHRAL CATHETER                                            NO  CENTRAL VENOUS CATHETER/PICC LINE                 NO  SEPSIS                                                                  NO    Assessment and Plan:  Patient is a 49 y/o male with recent HIV+ and HepC+ blood exposure who presented to the ED on 7-19-19 with a week of fever and abdominal pain that was admitted to the inpatient service for further workup and management.     #1) Fever  - Possibly 2/2 acute HIV infxn  - ID consultation   - Workup with 4th generation HIV1/2 immunoassay, Hepatitis panel   - CBC, CMP, UA, urine cx   - 2x Blood culture showed gram negative rods of Proteus   - Temperature control with acetaminophen 650 mg po q6h PRN temp >= 100.4F with 4g/day max     #2) Cough   - D/c lisinopril 5mg po qd; switch to losartan   - Guaifenesin 100mg po q6h PRN cough    #3) Abdominal pain with diarrhea and vomiting   - GI ppx - continue pantoprazole 40mg IV qd   - Morphine 4mg IV q4h PRN for severe pain   - Replenish fluids with D5+1/2NS    #4) Hypocalcemia  - CMP ordered  - Ionized calcium ordered  - Monitor for symptoms     #5) Essential HTN - controlled  - Switch from lisinopril to losartan     #6) Anxiety  - Continue trazodone       #Progress Note Handoff  Pending (specify):  Consults___ID______, Tests________, Test Results_______, Other_________  Family discussion:  Disposition: Home___/SNF___/Other________/Unknown at this time________ SARATHJAMAL  48y, Male  Allergy: penicillins (Unknown)      HPI:  · HPI Objective Statement: 48 year old male hx HTN presenting with 1 day of abdominal pain. Patient states he feels shortness of breath and has abdominal pain, fevers since sunday (tmax of 103), diaphoresis. Pain ranges from supra-umbilical, and RUQ. Bedside U/s revealed normal gallbladder. 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 He denies chest pain, vomiting, nausea, headache, back pain, neck pain. (19 Jul 2019 23:21)      OVERNIGHT EVENTS:  Patient was seen and evaluated at bedside. Patient discussed further about his history of present illness. He stated that on the day of his needle stick on 6-8-19, he went to [facility] and was prescribed Emtricitabine-Tenofovir and Raltegravir for 1 week for PEP. Afterwards, he was prescribed Biktarvy for 4 weeks however only took it for 2 weeks due to a delay in approval from his worker's comp for the second half of PEP. While on PEP, he experienced frequent diarrhea and vomiting and lost 20 lbs. 1 week ago, he started experiencing fevers with a Tmax of 103F, chills, and weakness. He also admitted to a dry cough for the last couple of days and a new episode of non bloody, watery diarrhea and vomiting that began yesterday. Today, patient felt afebrile however he had a fever of 101.2F last night.       PAST MEDICAL & SURGICAL HISTORY:  HTN (hypertension)  H/O left knee surgery      VITALS:  T(F): 98.1 (07-20-19 @ 05:21), Max: 101.2 (07-20-19 @ 02:19)  HR: 77 (07-20-19 @ 05:21)  BP: 120/65 (07-20-19 @ 05:21) (119/66 - 131/65)  RR: 18 (07-20-19 @ 05:21)  SpO2: 97% (07-20-19 @ 07:58)    General: WN/WD NAD  Neurology: A&Ox3, nonfocal  Eyes: PERRLA/ EOMI, Gross vision intact  ENT/Neck: Neck supple, trachea midline, No JVD, Gross hearing intact, + submandibular lymphadenopathy L>R  Respiratory: CTA B/L, No wheezing, rales, rhonchi  CV: RRR, S1S2, no murmurs, rubs or gallops  Abdominal: Soft, ND +BS, tender to palpation in bilateral upper quadrants, midline ventral hernia seen near umbilicus   Extremities: No edema, + peripheral pulses  Skin: No Rashes, Hematoma, Ecchymosis      TESTS & MEASUREMENTS:  Height (cm): 177.8 (07-20-19 @ 00:56)  Weight (kg): 96.4 (07-20-19 @ 00:56)  BMI (kg/m2): 30.5 (07-20-19 @ 00:56)                          13.3   5.55  )-----------( 144      ( 20 Jul 2019 06:51 )             39.0     PT/INR - ( 19 Jul 2019 16:50 )   PT: 14.60 sec;   INR: 1.27 ratio         PTT - ( 19 Jul 2019 16:50 )  PTT:31.3 sec  07-20    137  |  99  |  14  ----------------------------<  108<H>  4.0   |  28  |  1.0    Ca    7.8<L>      20 Jul 2019 06:51    TPro  6.4  /  Alb  3.8  /  TBili  1.2  /  DBili  0.4<H>  /  AST  47<H>  /  ALT  58<H>  /  AlkPhos  102  07-19    LIVER FUNCTIONS - ( 19 Jul 2019 16:50 )  Alb: 3.8 g/dL / Pro: 6.4 g/dL / ALK PHOS: 102 U/L / ALT: 58 U/L / AST: 47 U/L / GGT: x           CARDIAC MARKERS ( 19 Jul 2019 16:50 )  x     / <0.01 ng/mL / x     / x     / x          Culture - Blood (collected 07-19-19 @ 16:50)  Source: .Blood Blood  Gram Stain (07-20-19 @ 10:47):    Growth in aerobic bottle: Gram Negative Rods    Growth in anaerobic bottle: Gram Negative Rods  Preliminary Report (07-20-19 @ 10:47):    Growth in aerobic bottle: Gram Negative Rods    Growth in anaerobic bottle: Gram Negative Rods    "Due to technical problems, Proteus sp. will Not be reported as part of    the BCID panel until further notice"    ***Blood Panel PCR results on this specimenare available    approximately 3 hours after the Gram stain result.***    Gram stain, PCR, and/or culture results may not always    correspond due to difference in methodologies.    ************************************************************    This PCR assaywas performed using [employer].    The following targets are tested for: Enterococcus,    vancomycin resistant enterococci, Listeria monocytogenes,    coagulase negative staphylococci, S. aureus,    methicillin resistant S. aureus, Streptococcus agalactiae    (Group B), S. pneumoniae, S. pyogenes (Group A),    Acinetobacter baumannii, Enterobacter cloacae, E. coli,    Klebsiella oxytoca, K. pneumoniae, Proteus sp.,    Serratia marcescens, Haemophilus influenzae,    Neisseria meningitidis, Pseudomonas aeruginosa, Candida    albicans, C. glabrata, C krusei, C parapsilosis,    C. tropicalis and the KPC resistance gene.    Culture - Blood (collected 07-19-19 @ 16:50)  Source: .Blood Blood  Gram Stain (07-20-19 @ 10:48):    Growth in anaerobic bottle: Gram Negative Rods    Growth in aerobic bottle: Gram Negative Rods  Preliminary Report (07-20-19 @ 10:48):    Growth in anaerobic bottle: Gram Negative Rods    Growth in aerobic bottle: Gram Negative Rods      Urinalysis Basic - ( 19 Jul 2019 22:12 )    Color: Yellow / Appearance: Clear / SG: <=1.005 / pH: x  Gluc: x / Ketone: Negative  / Bili: Negative / Urobili: 2.0 mg/dL   Blood: x / Protein: Negative mg/dL / Nitrite: Negative   Leuk Esterase: Negative / RBC: x / WBC x   Sq Epi: x / Non Sq Epi: x / Bacteria: x      RADIOLOGY & ADDITIONAL TESTS:  < from: US Abdomen Limited (07.19.19 @ 21:33) >  IMPRESSION:      Mild dilation of the common bile duct up to 7 mm.    Otherwise, essentially unremarkable right upper quadrant ultrasound.    < end of copied text >    < from: CT Angio Chest Dissection Protocol (07.19.19 @ 18:27) >  IMPRESSION:    1.  No aortic dissection.    2.  No evidence of any acute inflammatory process within the chest,   abdomen, or pelvis.    3.  Nonspecific dilatation of the main pulmonary trunk to 3.5 cm.    < end of copied text >    < from: Xray Chest 1 View-PORTABLE IMMEDIATE (07.19.19 @ 16:50) >  Impression:      No radiographic evidence of acute cardiopulmonary disease.    < end of copied text >      MEDICATIONS:  MEDICATIONS  (STANDING):  cefoTEtan  IVPB 2 Gram(s) IV Intermittent every 12 hours  dextrose 5% + sodium chloride 0.9%. 1000 milliLiter(s) (100 mL/Hr) IV Continuous <Continuous>  heparin  Injectable 5000 Unit(s) SubCutaneous every 12 hours  lisinopril 5 milliGRAM(s) Oral daily  pantoprazole  Injectable 40 milliGRAM(s) IV Push daily    MEDICATIONS  (PRN):  acetaminophen   Tablet .. 650 milliGRAM(s) Oral every 6 hours PRN Temp greater or equal to 38C (100.4F)  guaiFENesin    Syrup 100 milliGRAM(s) Oral every 6 hours PRN Cough  morphine  - Injectable 4 milliGRAM(s) IV Push every 4 hours PRN Severe Pain (7 - 10)      HEALTH ISSUES - PROBLEM Dx:  Epigastric pain: Epigastric pain  HTN (hypertension): HTN (hypertension)  Fever, unspecified fever cause: Fever, unspecified fever cause      Case discussed in details with: Dr. Joseph Velázquez     PRESSURE ULCERS                                                 NO  URETHRAL CATHETER                                            NO  CENTRAL VENOUS CATHETER/PICC LINE                 NO  SEPSIS                                                                  NO    Assessment and Plan:  Patient is a 49 y/o male with recent HIV+ and HepC+ blood exposure who presented to the ED on 7-19-19 with a week of fever and abdominal pain that was admitted to the inpatient service for further workup and management.     #1) Fever  - Possibly 2/2 acute HIV infxn  - ID consultation   - Workup with 4th generation HIV1/2 immunoassay, Hepatitis panel, heterophile Ab test   - CBC, CMP, UA, urine cx   - 2x Blood culture showed gram negative rods of Proteus   - Temperature control with acetaminophen 650 mg po q6h PRN temp >= 100.4F with 4g/day max     #2) Abdominal pain with diarrhea and vomiting   - GI ppx - continue pantoprazole 40mg IV qd   - Stool ova and parasites; stool culture   - Morphine 4mg IV q4h PRN for severe pain   - Replenish fluids with D5+1/2NS    #3) Cough   - D/c lisinopril 5mg po qd; switch to losartan   - Guaifenesin 100mg po q6h PRN cough    #4) Hypocalcemia  - CMP ordered  - Ionized calcium ordered  - Monitor for symptoms     #5) Essential HTN - controlled  - Switch from lisinopril to losartan     #6) Anxiety  - Continue trazodone       #Progress Note Handoff  Pending (specify):  Consults___ID______, Tests________, Test Results_______, Other_________  Family discussion:  Disposition: Home___/SNF___/Other________/Unknown at this time________ SARATHJAMAL  48y, Male  Allergy: penicillins (Unknown)      HPI:  · HPI Objective Statement: 48 year old male hx HTN presenting with 1 day of abdominal pain. Patient states he feels shortness of breath and has abdominal pain, fevers since sunday (tmax of 103), diaphoresis. Pain ranges from supra-umbilical, and RUQ. Bedside U/s revealed normal gallbladder. 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 He denies chest pain, vomiting, nausea, headache, back pain, neck pain. (19 Jul 2019 23:21)      OVERNIGHT EVENTS:  Patient was seen and evaluated at bedside. Patient discussed further about his history of present illness. He stated that on the day of his needle stick on 6-8-19, he went to [facility] and was prescribed Emtricitabine-Tenofovir and Raltegravir for 1 week for PEP. Afterwards, he was prescribed Biktarvy for 4 weeks however only took it for 2 weeks due to a delay in approval from his worker's comp for the second half of PEP. While on PEP, he experienced frequent diarrhea and vomiting and lost 20 lbs. 1 week ago, he started experiencing fevers with a Tmax of 103F, chills, and weakness. He also admitted to a dry cough for the last couple of days and a new episode of non bloody, watery diarrhea and vomiting that began yesterday. Today, patient felt afebrile however he had a fever of 101.2F last night.       PAST MEDICAL & SURGICAL HISTORY:  HTN (hypertension)  H/O left knee surgery      VITALS:  T(F): 98.1 (07-20-19 @ 05:21), Max: 101.2 (07-20-19 @ 02:19)  HR: 77 (07-20-19 @ 05:21)  BP: 120/65 (07-20-19 @ 05:21) (119/66 - 131/65)  RR: 18 (07-20-19 @ 05:21)  SpO2: 97% (07-20-19 @ 07:58)    General: WN/WD NAD  Neurology: A&Ox3, nonfocal  Eyes: PERRLA/ EOMI, Gross vision intact  ENT/Neck: Neck supple, trachea midline, No JVD, Gross hearing intact, + submandibular lymphadenopathy L>R  Respiratory: CTA B/L, No wheezing, rales, rhonchi  CV: RRR, S1S2, no murmurs, rubs or gallops  Abdominal: Soft, ND +BS, tender to palpation in bilateral upper quadrants, midline ventral hernia seen near umbilicus   Extremities: No edema, + peripheral pulses  Skin: No Rashes, Hematoma, Ecchymosis      TESTS & MEASUREMENTS:  Height (cm): 177.8 (07-20-19 @ 00:56)  Weight (kg): 96.4 (07-20-19 @ 00:56)  BMI (kg/m2): 30.5 (07-20-19 @ 00:56)                          13.3   5.55  )-----------( 144      ( 20 Jul 2019 06:51 )             39.0     PT/INR - ( 19 Jul 2019 16:50 )   PT: 14.60 sec;   INR: 1.27 ratio         PTT - ( 19 Jul 2019 16:50 )  PTT:31.3 sec  07-20    137  |  99  |  14  ----------------------------<  108<H>  4.0   |  28  |  1.0    Ca    7.8<L>      20 Jul 2019 06:51    TPro  6.4  /  Alb  3.8  /  TBili  1.2  /  DBili  0.4<H>  /  AST  47<H>  /  ALT  58<H>  /  AlkPhos  102  07-19    LIVER FUNCTIONS - ( 19 Jul 2019 16:50 )  Alb: 3.8 g/dL / Pro: 6.4 g/dL / ALK PHOS: 102 U/L / ALT: 58 U/L / AST: 47 U/L / GGT: x           CARDIAC MARKERS ( 19 Jul 2019 16:50 )  x     / <0.01 ng/mL / x     / x     / x          Culture - Blood (collected 07-19-19 @ 16:50)  Source: .Blood Blood  Gram Stain (07-20-19 @ 10:47):    Growth in aerobic bottle: Gram Negative Rods    Growth in anaerobic bottle: Gram Negative Rods  Preliminary Report (07-20-19 @ 10:47):    Growth in aerobic bottle: Gram Negative Rods    Growth in anaerobic bottle: Gram Negative Rods    "Due to technical problems, Proteus sp. will Not be reported as part of    the BCID panel until further notice"    ***Blood Panel PCR results on this specimenare available    approximately 3 hours after the Gram stain result.***    Gram stain, PCR, and/or culture results may not always    correspond due to difference in methodologies.    ************************************************************    This PCR assaywas performed using [employer].    The following targets are tested for: Enterococcus,    vancomycin resistant enterococci, Listeria monocytogenes,    coagulase negative staphylococci, S. aureus,    methicillin resistant S. aureus, Streptococcus agalactiae    (Group B), S. pneumoniae, S. pyogenes (Group A),    Acinetobacter baumannii, Enterobacter cloacae, E. coli,    Klebsiella oxytoca, K. pneumoniae, Proteus sp.,    Serratia marcescens, Haemophilus influenzae,    Neisseria meningitidis, Pseudomonas aeruginosa, Candida    albicans, C. glabrata, C krusei, C parapsilosis,    C. tropicalis and the KPC resistance gene.    Culture - Blood (collected 07-19-19 @ 16:50)  Source: .Blood Blood  Gram Stain (07-20-19 @ 10:48):    Growth in anaerobic bottle: Gram Negative Rods    Growth in aerobic bottle: Gram Negative Rods  Preliminary Report (07-20-19 @ 10:48):    Growth in anaerobic bottle: Gram Negative Rods    Growth in aerobic bottle: Gram Negative Rods      Urinalysis Basic - ( 19 Jul 2019 22:12 )    Color: Yellow / Appearance: Clear / SG: <=1.005 / pH: x  Gluc: x / Ketone: Negative  / Bili: Negative / Urobili: 2.0 mg/dL   Blood: x / Protein: Negative mg/dL / Nitrite: Negative   Leuk Esterase: Negative / RBC: x / WBC x   Sq Epi: x / Non Sq Epi: x / Bacteria: x      RADIOLOGY & ADDITIONAL TESTS:  < from: US Abdomen Limited (07.19.19 @ 21:33) >  IMPRESSION:      Mild dilation of the common bile duct up to 7 mm.    Otherwise, essentially unremarkable right upper quadrant ultrasound.    < end of copied text >    < from: CT Angio Chest Dissection Protocol (07.19.19 @ 18:27) >  IMPRESSION:    1.  No aortic dissection.    2.  No evidence of any acute inflammatory process within the chest,   abdomen, or pelvis.    3.  Nonspecific dilatation of the main pulmonary trunk to 3.5 cm.    < end of copied text >    < from: Xray Chest 1 View-PORTABLE IMMEDIATE (07.19.19 @ 16:50) >  Impression:      No radiographic evidence of acute cardiopulmonary disease.    < end of copied text >      MEDICATIONS:  MEDICATIONS  (STANDING):  cefoTEtan  IVPB 2 Gram(s) IV Intermittent every 12 hours  dextrose 5% + sodium chloride 0.9%. 1000 milliLiter(s) (100 mL/Hr) IV Continuous <Continuous>  heparin  Injectable 5000 Unit(s) SubCutaneous every 12 hours  lisinopril 5 milliGRAM(s) Oral daily  pantoprazole  Injectable 40 milliGRAM(s) IV Push daily    MEDICATIONS  (PRN):  acetaminophen   Tablet .. 650 milliGRAM(s) Oral every 6 hours PRN Temp greater or equal to 38C (100.4F)  guaiFENesin    Syrup 100 milliGRAM(s) Oral every 6 hours PRN Cough  morphine  - Injectable 4 milliGRAM(s) IV Push every 4 hours PRN Severe Pain (7 - 10)      HEALTH ISSUES - PROBLEM Dx:  Epigastric pain: Epigastric pain  HTN (hypertension): HTN (hypertension)  Fever, unspecified fever cause: Fever, unspecified fever cause      Case discussed in details with: Dr. Joseph Velázquez     PRESSURE ULCERS                                                 NO  URETHRAL CATHETER                                            NO  CENTRAL VENOUS CATHETER/PICC LINE                 NO  SEPSIS                                                                  NO    Assessment and Plan:  Patient is a 49 y/o male with recent HIV+ and HepC+ blood exposure who presented to the ED on 7-19-19 with a week of fever and abdominal pain that was admitted to the inpatient service for further workup and management.     #1) Fever 2/2 bacteremia, GNR  - ID consultation   - Workup with 4th generation HIV1/2 immunoassay, Hepatitis panel, heterophile Ab test   - CBC, CMP, UA, urine cx   - 2x Blood culture showed gram negative rods of Proteus , f/u sensitiity  - Temperature control with acetaminophen 650 mg po q6h PRN temp >= 100.4F with 4g/day max     #2) Abdominal pain with diarrhea and vomiting   - GI ppx - continue pantoprazole 40mg IV qd   - Stool ova and parasites; stool culture   - Morphine 4mg IV q4h PRN for severe pain   - Replenish fluids with D5+1/2NS    #3) Dry cough   -- Guaifenesin 100mg po q6h PRN cough  CXR does not show any infiltrates.    #4) Hypocalcemia  - CMP ordered  - Ionized calcium ordered  - Monitor for symptoms     #5) Essential HTN - controlled  - c/w lisinopril    #6) Anxiety  - Continue trazodone       #Progress Note Handoff  Pending (specify):  Consults___ID______, Tests________, Test Results_______, Other_________  Family discussion:  Disposition: Home___/SNF___/Other________/Unknown at this time________ SARATHJAMAL  48y, Male  Allergy: penicillins (Unknown)      HPI:  · HPI Objective Statement: 48 year old male hx HTN presenting with 1 day of abdominal pain. Patient states he feels shortness of breath and has abdominal pain, fevers since sunday (tmax of 103), diaphoresis. Pain ranges from supra-umbilical, and RUQ. Bedside U/s revealed normal gallbladder. 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 He denies chest pain, vomiting, nausea, headache, back pain, neck pain. (19 Jul 2019 23:21)      OVERNIGHT EVENTS:  Patient was seen and evaluated at bedside. Patient discussed further about his history of present illness. He stated that on the day of his needle stick on 6-8-19, he went to [facility] and was prescribed Emtricitabine-Tenofovir and Raltegravir for 1 week for PEP. Afterwards, he was prescribed Biktarvy for 4 weeks however only took it for 2 weeks due to a delay in approval from his worker's comp for the second half of PEP. While on PEP, he experienced frequent diarrhea and vomiting and lost 20 lbs. 1 week ago, he started experiencing fevers with a Tmax of 103F, chills, and weakness. He also admitted to a dry cough for the last couple of days and a new episode of non bloody, watery diarrhea and vomiting that began yesterday. Today, patient felt afebrile however he had a fever of 101.2F last night.       PAST MEDICAL & SURGICAL HISTORY:  HTN (hypertension)  H/O left knee surgery      VITALS:  T(F): 98.1 (07-20-19 @ 05:21), Max: 101.2 (07-20-19 @ 02:19)  HR: 77 (07-20-19 @ 05:21)  BP: 120/65 (07-20-19 @ 05:21) (119/66 - 131/65)  RR: 18 (07-20-19 @ 05:21)  SpO2: 97% (07-20-19 @ 07:58)    General: WN/WD NAD  Neurology: A&Ox3, nonfocal  Eyes: PERRLA/ EOMI, Gross vision intact  ENT/Neck: Neck supple, trachea midline, No JVD, Gross hearing intact, + submandibular lymphadenopathy L>R  Respiratory: CTA B/L, No wheezing, rales, rhonchi  CV: RRR, S1S2, no murmurs, rubs or gallops  Abdominal: Soft, ND +BS, tender to palpation in bilateral upper quadrants, midline ventral hernia seen near umbilicus   Extremities: No edema, + peripheral pulses  Skin: No Rashes, Hematoma, Ecchymosis      TESTS & MEASUREMENTS:  Height (cm): 177.8 (07-20-19 @ 00:56)  Weight (kg): 96.4 (07-20-19 @ 00:56)  BMI (kg/m2): 30.5 (07-20-19 @ 00:56)                          13.3   5.55  )-----------( 144      ( 20 Jul 2019 06:51 )             39.0     PT/INR - ( 19 Jul 2019 16:50 )   PT: 14.60 sec;   INR: 1.27 ratio         PTT - ( 19 Jul 2019 16:50 )  PTT:31.3 sec  07-20    137  |  99  |  14  ----------------------------<  108<H>  4.0   |  28  |  1.0    Ca    7.8<L>      20 Jul 2019 06:51    TPro  6.4  /  Alb  3.8  /  TBili  1.2  /  DBili  0.4<H>  /  AST  47<H>  /  ALT  58<H>  /  AlkPhos  102  07-19    LIVER FUNCTIONS - ( 19 Jul 2019 16:50 )  Alb: 3.8 g/dL / Pro: 6.4 g/dL / ALK PHOS: 102 U/L / ALT: 58 U/L / AST: 47 U/L / GGT: x           CARDIAC MARKERS ( 19 Jul 2019 16:50 )  x     / <0.01 ng/mL / x     / x     / x          Culture - Blood (collected 07-19-19 @ 16:50)  Source: .Blood Blood  Gram Stain (07-20-19 @ 10:47):    Growth in aerobic bottle: Gram Negative Rods    Growth in anaerobic bottle: Gram Negative Rods  Preliminary Report (07-20-19 @ 10:47):    Growth in aerobic bottle: Gram Negative Rods    Growth in anaerobic bottle: Gram Negative Rods    "Due to technical problems, Proteus sp. will Not be reported as part of    the BCID panel until further notice"    ***Blood Panel PCR results on this specimenare available    approximately 3 hours after the Gram stain result.***    Gram stain, PCR, and/or culture results may not always    correspond due to difference in methodologies.    ************************************************************    This PCR assaywas performed using [employer]    The following targets are tested for: Enterococcus,    vancomycin resistant enterococci, Listeria monocytogenes,    coagulase negative staphylococci, S. aureus,    methicillin resistant S. aureus, Streptococcus agalactiae    (Group B), S. pneumoniae, S. pyogenes (Group A),    Acinetobacter baumannii, Enterobacter cloacae, E. coli,    Klebsiella oxytoca, K. pneumoniae, Proteus sp.,    Serratia marcescens, Haemophilus influenzae,    Neisseria meningitidis, Pseudomonas aeruginosa, Candida    albicans, C. glabrata, C krusei, C parapsilosis,    C. tropicalis and the KPC resistance gene.    Culture - Blood (collected 07-19-19 @ 16:50)  Source: .Blood Blood  Gram Stain (07-20-19 @ 10:48):    Growth in anaerobic bottle: Gram Negative Rods    Growth in aerobic bottle: Gram Negative Rods  Preliminary Report (07-20-19 @ 10:48):    Growth in anaerobic bottle: Gram Negative Rods    Growth in aerobic bottle: Gram Negative Rods      Urinalysis Basic - ( 19 Jul 2019 22:12 )    Color: Yellow / Appearance: Clear / SG: <=1.005 / pH: x  Gluc: x / Ketone: Negative  / Bili: Negative / Urobili: 2.0 mg/dL   Blood: x / Protein: Negative mg/dL / Nitrite: Negative   Leuk Esterase: Negative / RBC: x / WBC x   Sq Epi: x / Non Sq Epi: x / Bacteria: x      RADIOLOGY & ADDITIONAL TESTS:  < from: US Abdomen Limited (07.19.19 @ 21:33) >  IMPRESSION:      Mild dilation of the common bile duct up to 7 mm.    Otherwise, essentially unremarkable right upper quadrant ultrasound.    < end of copied text >    < from: CT Angio Chest Dissection Protocol (07.19.19 @ 18:27) >  IMPRESSION:    1.  No aortic dissection.    2.  No evidence of any acute inflammatory process within the chest,   abdomen, or pelvis.    3.  Nonspecific dilatation of the main pulmonary trunk to 3.5 cm.    < end of copied text >    < from: Xray Chest 1 View-PORTABLE IMMEDIATE (07.19.19 @ 16:50) >  Impression:      No radiographic evidence of acute cardiopulmonary disease.    < end of copied text >      MEDICATIONS:  MEDICATIONS  (STANDING):  cefoTEtan  IVPB 2 Gram(s) IV Intermittent every 12 hours  dextrose 5% + sodium chloride 0.9%. 1000 milliLiter(s) (100 mL/Hr) IV Continuous <Continuous>  heparin  Injectable 5000 Unit(s) SubCutaneous every 12 hours  lisinopril 5 milliGRAM(s) Oral daily  pantoprazole  Injectable 40 milliGRAM(s) IV Push daily    MEDICATIONS  (PRN):  acetaminophen   Tablet .. 650 milliGRAM(s) Oral every 6 hours PRN Temp greater or equal to 38C (100.4F)  guaiFENesin    Syrup 100 milliGRAM(s) Oral every 6 hours PRN Cough  morphine  - Injectable 4 milliGRAM(s) IV Push every 4 hours PRN Severe Pain (7 - 10)      HEALTH ISSUES - PROBLEM Dx:  Epigastric pain: Epigastric pain  HTN (hypertension): HTN (hypertension)  Fever, unspecified fever cause: Fever, unspecified fever cause      Case discussed in details with: Dr. Joseph Velázquez     PRESSURE ULCERS                                                 NO  URETHRAL CATHETER                                            NO  CENTRAL VENOUS CATHETER/PICC LINE                 NO  SEPSIS                                                                  NO    Assessment and Plan:  Patient is a 47 y/o male with recent HIV+ and HepC+ blood exposure who presented to the ED on 7-19-19 with a week of fever and abdominal pain that was admitted to the inpatient service for further workup and management.     #1) Fever 2/2 bacteremia, GNR, E coli  - ID consultation   - Workup with 4th generation HIV1/2 immunoassay, Hepatitis panel, heterophile Ab test   - CBC, CMP, UA, urine cx   - 2x Blood culture showed gram negative rods , E coli , f/u sensitivity  - Temperature control with acetaminophen 650 mg po q6h PRN temp >= 100.4F with 4g/day max     #2) Abdominal pain with diarrhea and vomiting   - GI ppx - continue pantoprazole 40mg IV qd   - Stool ova and parasites; stool culture   - Morphine 4mg IV q4h PRN for severe pain   - Replenish fluids with D5+1/2NS    #3) Dry cough   -- Guaifenesin 100mg po q6h PRN cough  CXR does not show any infiltrates.    #4) Hypocalcemia  - CMP ordered  - Ionized calcium ordered  - Monitor for symptoms     #5) Essential HTN - controlled  - c/w lisinopril    #6) Anxiety  - Continue trazodone       #Progress Note Handoff  Pending (specify):  Consults___ID______, Tests________, Test Results_______, Other_________  Family discussion:  Disposition: Home___/SNF___/Other________/Unknown at this time________ SARATHJAMAL  48y, Male  Allergy: penicillins (Unknown)      HPI:  · HPI Objective Statement: 48 year old male hx HTN presenting with 1 day of abdominal pain. Patient states he feels shortness of breath and has abdominal pain, fevers since sunday (tmax of 103), diaphoresis. Pain ranges from supra-umbilical, and RUQ. Bedside U/s revealed normal gallbladder. 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 He denies chest pain, vomiting, nausea, headache, back pain, neck pain. (19 Jul 2019 23:21)      OVERNIGHT EVENTS:  Patient was seen and evaluated at bedside. Patient discussed further about his history of present illness. He stated that on the day of his needle stick on 6-8-19, he went to [facility] and was prescribed Emtricitabine-Tenofovir and Raltegravir for 1 week for PEP. Afterwards, he was prescribed Biktarvy for 4 weeks however only took it for 2 weeks due to a delay in approval from his worker's comp for the second half of PEP. While on PEP, he experienced frequent diarrhea and vomiting and lost 20 lbs. 1 week ago, he started experiencing fevers with a Tmax of 103F, chills, and weakness. He also admitted to a dry cough for the last couple of days and a new episode of non bloody, watery diarrhea and vomiting that began yesterday. Today, patient felt afebrile however he had a fever of 101.2F last night.       PAST MEDICAL & SURGICAL HISTORY:  HTN (hypertension)  H/O left knee surgery      VITALS:  T(F): 98.1 (07-20-19 @ 05:21), Max: 101.2 (07-20-19 @ 02:19)  HR: 77 (07-20-19 @ 05:21)  BP: 120/65 (07-20-19 @ 05:21) (119/66 - 131/65)  RR: 18 (07-20-19 @ 05:21)  SpO2: 97% (07-20-19 @ 07:58)    General: WN/WD NAD  Neurology: A&Ox3, nonfocal  Eyes: PERRLA/ EOMI, Gross vision intact  ENT/Neck: Neck supple, trachea midline, No JVD, Gross hearing intact, + submandibular lymphadenopathy L>R  Respiratory: CTA B/L, No wheezing, rales, rhonchi  CV: RRR, S1S2, no murmurs, rubs or gallops  Abdominal: Soft, ND +BS, tender to palpation in bilateral upper quadrants, midline ventral hernia seen near umbilicus   Extremities: No edema, + peripheral pulses  Skin: No Rashes, Hematoma, Ecchymosis      TESTS & MEASUREMENTS:  Height (cm): 177.8 (07-20-19 @ 00:56)  Weight (kg): 96.4 (07-20-19 @ 00:56)  BMI (kg/m2): 30.5 (07-20-19 @ 00:56)                          13.3   5.55  )-----------( 144      ( 20 Jul 2019 06:51 )             39.0     PT/INR - ( 19 Jul 2019 16:50 )   PT: 14.60 sec;   INR: 1.27 ratio         PTT - ( 19 Jul 2019 16:50 )  PTT:31.3 sec  07-20    137  |  99  |  14  ----------------------------<  108<H>  4.0   |  28  |  1.0    Ca    7.8<L>      20 Jul 2019 06:51    TPro  6.4  /  Alb  3.8  /  TBili  1.2  /  DBili  0.4<H>  /  AST  47<H>  /  ALT  58<H>  /  AlkPhos  102  07-19    LIVER FUNCTIONS - ( 19 Jul 2019 16:50 )  Alb: 3.8 g/dL / Pro: 6.4 g/dL / ALK PHOS: 102 U/L / ALT: 58 U/L / AST: 47 U/L / GGT: x           CARDIAC MARKERS ( 19 Jul 2019 16:50 )  x     / <0.01 ng/mL / x     / x     / x          Culture - Blood (collected 07-19-19 @ 16:50)  Source: .Blood Blood  Gram Stain (07-20-19 @ 10:47):    Growth in aerobic bottle: Gram Negative Rods    Growth in anaerobic bottle: Gram Negative Rods  Preliminary Report (07-20-19 @ 10:47):    Growth in aerobic bottle: Gram Negative Rods    Growth in anaerobic bottle: Gram Negative Rods    "Due to technical problems, Proteus sp. will Not be reported as part of    the BCID panel until further notice"    ***Blood Panel PCR results on this specimenare available    approximately 3 hours after the Gram stain result.***    Gram stain, PCR, and/or culture results may not always    correspond due to difference in methodologies.    ************************************************************    This PCR assaywas performed using [employer].    The following targets are tested for: Enterococcus,    vancomycin resistant enterococci, Listeria monocytogenes,    coagulase negative staphylococci, S. aureus,    methicillin resistant S. aureus, Streptococcus agalactiae    (Group B), S. pneumoniae, S. pyogenes (Group A),    Acinetobacter baumannii, Enterobacter cloacae, E. coli,    Klebsiella oxytoca, K. pneumoniae, Proteus sp.,    Serratia marcescens, Haemophilus influenzae,    Neisseria meningitidis, Pseudomonas aeruginosa, Candida    albicans, C. glabrata, C krusei, C parapsilosis,    C. tropicalis and the KPC resistance gene.    Culture - Blood (collected 07-19-19 @ 16:50)  Source: .Blood Blood  Gram Stain (07-20-19 @ 10:48):    Growth in anaerobic bottle: Gram Negative Rods    Growth in aerobic bottle: Gram Negative Rods  Preliminary Report (07-20-19 @ 10:48):    Growth in anaerobic bottle: Gram Negative Rods    Growth in aerobic bottle: Gram Negative Rods      Urinalysis Basic - ( 19 Jul 2019 22:12 )    Color: Yellow / Appearance: Clear / SG: <=1.005 / pH: x  Gluc: x / Ketone: Negative  / Bili: Negative / Urobili: 2.0 mg/dL   Blood: x / Protein: Negative mg/dL / Nitrite: Negative   Leuk Esterase: Negative / RBC: x / WBC x   Sq Epi: x / Non Sq Epi: x / Bacteria: x      RADIOLOGY & ADDITIONAL TESTS:  < from: US Abdomen Limited (07.19.19 @ 21:33) >  IMPRESSION:      Mild dilation of the common bile duct up to 7 mm.    Otherwise, essentially unremarkable right upper quadrant ultrasound.    < end of copied text >    < from: CT Angio Chest Dissection Protocol (07.19.19 @ 18:27) >  IMPRESSION:    1.  No aortic dissection.    2.  No evidence of any acute inflammatory process within the chest,   abdomen, or pelvis.    3.  Nonspecific dilatation of the main pulmonary trunk to 3.5 cm.    < end of copied text >    < from: Xray Chest 1 View-PORTABLE IMMEDIATE (07.19.19 @ 16:50) >  Impression:      No radiographic evidence of acute cardiopulmonary disease.    < end of copied text >      MEDICATIONS:  MEDICATIONS  (STANDING):  cefoTEtan  IVPB 2 Gram(s) IV Intermittent every 12 hours  dextrose 5% + sodium chloride 0.9%. 1000 milliLiter(s) (100 mL/Hr) IV Continuous <Continuous>  heparin  Injectable 5000 Unit(s) SubCutaneous every 12 hours  lisinopril 5 milliGRAM(s) Oral daily  pantoprazole  Injectable 40 milliGRAM(s) IV Push daily    MEDICATIONS  (PRN):  acetaminophen   Tablet .. 650 milliGRAM(s) Oral every 6 hours PRN Temp greater or equal to 38C (100.4F)  guaiFENesin    Syrup 100 milliGRAM(s) Oral every 6 hours PRN Cough  morphine  - Injectable 4 milliGRAM(s) IV Push every 4 hours PRN Severe Pain (7 - 10)      HEALTH ISSUES - PROBLEM Dx:  Epigastric pain: Epigastric pain  HTN (hypertension): HTN (hypertension)  Fever, unspecified fever cause: Fever, unspecified fever cause      Case discussed in details with: Dr. Joseph Velázquez     PRESSURE ULCERS                                                 NO  URETHRAL CATHETER                                            NO  CENTRAL VENOUS CATHETER/PICC LINE                 NO  SEPSIS                                                                  NO    Assessment and Plan:  Patient is a 49 y/o male with recent HIV+ and HepC+ blood exposure who presented to the ED on 7-19-19 with a week of fever and abdominal pain that was admitted to the inpatient service for further workup and management.     #1) Fever 2/2 bacteremia, GNR, E coli  - ID consultation   - Workup with 4th generation HIV1/2 immunoassay, Hepatitis panel, heterophile Ab test   - CBC, CMP, UA, urine cx   - 2x Blood culture showed gram negative rods , E coli , f/u sensitivity  - Temperature control with acetaminophen 650 mg po q6h PRN temp >= 100.4F with 4g/day max     #2) Abdominal pain with diarrhea and vomiting   - GI ppx - continue pantoprazole 40mg IV qd   - Stool ova and parasites; stool culture   - Morphine 4mg IV q4h PRN for severe pain   - Replenish fluids with D5+1/2NS    #3) Dry cough   -- Guaifenesin 100mg po q6h PRN cough  CXR does not show any infiltrates.    #4) Hypocalcemia  - CMP ordered  - Ionized calcium ordered  - Monitor for symptoms     #5) Essential HTN - controlled  - c/w lisinopril    #6) Anxiety  - Continue trazodone       #Progress Note Handoff  Pending : cultures  Disposition:  Home when stable   Discussion: Discussed with patient

## 2019-07-20 NOTE — CONSULT NOTE ADULT - ASSESSMENT
IMPRESSION:  SIRS response with GNR bacteremia ( anaerobe ) with possibly translocation from the GI tract.  This has little to do with his recent ABx but would rule out CD colitis  CT chest/abd/pelvis with no pathology  No acute appendicitis/cholangitis  Clinically no peritonitis  Clinically very stable  No sepsis    RECOMMENDATIONS:  F/u final ID of GNR  Stool for CD  Rocephin 2 gm iv q24h  Flagyl 500 mg iv q8h  D/c cefotetan  Will need a repeat HIV test 4-6 weeks down the line

## 2019-07-21 LAB
C DIFF BY PCR RESULT: NEGATIVE — SIGNIFICANT CHANGE UP
C DIFF TOX GENS STL QL NAA+PROBE: SIGNIFICANT CHANGE UP
CULTURE RESULTS: NO GROWTH — SIGNIFICANT CHANGE UP
HIV 1+2 AB+HIV1 P24 AG SERPL QL IA: SIGNIFICANT CHANGE UP
SPECIMEN SOURCE: SIGNIFICANT CHANGE UP

## 2019-07-21 PROCEDURE — 99233 SBSQ HOSP IP/OBS HIGH 50: CPT

## 2019-07-21 RX ORDER — ONDANSETRON 8 MG/1
4 TABLET, FILM COATED ORAL ONCE
Refills: 0 | Status: COMPLETED | OUTPATIENT
Start: 2019-07-21 | End: 2019-07-21

## 2019-07-21 RX ADMIN — Medication 100 MILLIGRAM(S): at 06:20

## 2019-07-21 RX ADMIN — HEPARIN SODIUM 5000 UNIT(S): 5000 INJECTION INTRAVENOUS; SUBCUTANEOUS at 17:08

## 2019-07-21 RX ADMIN — Medication 100 MILLIGRAM(S): at 13:58

## 2019-07-21 RX ADMIN — HEPARIN SODIUM 5000 UNIT(S): 5000 INJECTION INTRAVENOUS; SUBCUTANEOUS at 06:25

## 2019-07-21 RX ADMIN — MORPHINE SULFATE 4 MILLIGRAM(S): 50 CAPSULE, EXTENDED RELEASE ORAL at 21:33

## 2019-07-21 RX ADMIN — SODIUM CHLORIDE 100 MILLILITER(S): 9 INJECTION, SOLUTION INTRAVENOUS at 21:26

## 2019-07-21 RX ADMIN — Medication 100 MILLIGRAM(S): at 21:25

## 2019-07-21 RX ADMIN — MORPHINE SULFATE 4 MILLIGRAM(S): 50 CAPSULE, EXTENDED RELEASE ORAL at 11:06

## 2019-07-21 RX ADMIN — CEFTRIAXONE 100 MILLIGRAM(S): 500 INJECTION, POWDER, FOR SOLUTION INTRAMUSCULAR; INTRAVENOUS at 18:38

## 2019-07-21 RX ADMIN — LISINOPRIL 5 MILLIGRAM(S): 2.5 TABLET ORAL at 06:25

## 2019-07-21 RX ADMIN — MORPHINE SULFATE 4 MILLIGRAM(S): 50 CAPSULE, EXTENDED RELEASE ORAL at 10:22

## 2019-07-21 RX ADMIN — SODIUM CHLORIDE 100 MILLILITER(S): 9 INJECTION, SOLUTION INTRAVENOUS at 10:10

## 2019-07-21 RX ADMIN — PANTOPRAZOLE SODIUM 40 MILLIGRAM(S): 20 TABLET, DELAYED RELEASE ORAL at 12:43

## 2019-07-21 RX ADMIN — ONDANSETRON 4 MILLIGRAM(S): 8 TABLET, FILM COATED ORAL at 17:07

## 2019-07-21 NOTE — PROGRESS NOTE ADULT - SUBJECTIVE AND OBJECTIVE BOX
SARATHJAMAL  48y, Male  Allergy: penicillins (Unknown)      HPI:  · HPI Objective Statement: 48 year old male hx HTN presenting with 1 day of abdominal pain. Patient states he feels shortness of breath and has abdominal pain, fevers since sunday (tmax of 103), diaphoresis. Pain ranges from supra-umbilical, and RUQ. Bedside U/s revealed normal gallbladder. 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 He denies chest pain, vomiting, nausea, headache, back pain, neck pain. (19 Jul 2019 23:21)      OVERNIGHT EVENTS:  Patient was seen and evaluated at bedside. Patient discussed further about his history of present illness. He stated that on the day of his needle stick on 6-8-19, he went to [facility] and was prescribed Emtricitabine-Tenofovir and Raltegravir for 1 week for PEP. Afterwards, he was prescribed Biktarvy for 4 weeks however only took it for 2 weeks due to a delay in approval from his worker's comp for the second half of PEP. While on PEP, he experienced frequent diarrhea and vomiting and lost 20 lbs. 1 week ago, he started experiencing fevers with a Tmax of 103F, chills, and weakness. He also admitted to a dry cough for the last couple of days and a new episode of non bloody, watery diarrhea and vomiting that began yesterday. Today, patient felt afebrile however he had a fever of 101.2F last night.       PAST MEDICAL & SURGICAL HISTORY:  HTN (hypertension)  H/O left knee surgery      VITALS:  T(F): 98.1 (07-20-19 @ 05:21), Max: 101.2 (07-20-19 @ 02:19)  HR: 77 (07-20-19 @ 05:21)  BP: 120/65 (07-20-19 @ 05:21) (119/66 - 131/65)  RR: 18 (07-20-19 @ 05:21)  SpO2: 97% (07-20-19 @ 07:58)    General: WN/WD NAD  Neurology: A&Ox3, nonfocal  Eyes: PERRLA/ EOMI, Gross vision intact  ENT/Neck: Neck supple, trachea midline, No JVD, Gross hearing intact, + submandibular lymphadenopathy L>R  Respiratory: CTA B/L, No wheezing, rales, rhonchi  CV: RRR, S1S2, no murmurs, rubs or gallops  Abdominal: Soft, ND +BS, tender to palpation in bilateral upper quadrants, midline ventral hernia seen near umbilicus   Extremities: No edema, + peripheral pulses  Skin: No Rashes, Hematoma, Ecchymosis      TESTS & MEASUREMENTS:  Height (cm): 177.8 (07-20-19 @ 00:56)  Weight (kg): 96.4 (07-20-19 @ 00:56)  BMI (kg/m2): 30.5 (07-20-19 @ 00:56)                          13.3   5.55  )-----------( 144      ( 20 Jul 2019 06:51 )             39.0     PT/INR - ( 19 Jul 2019 16:50 )   PT: 14.60 sec;   INR: 1.27 ratio         PTT - ( 19 Jul 2019 16:50 )  PTT:31.3 sec  07-20    137  |  99  |  14  ----------------------------<  108<H>  4.0   |  28  |  1.0    Ca    7.8<L>      20 Jul 2019 06:51    TPro  6.4  /  Alb  3.8  /  TBili  1.2  /  DBili  0.4<H>  /  AST  47<H>  /  ALT  58<H>  /  AlkPhos  102  07-19    LIVER FUNCTIONS - ( 19 Jul 2019 16:50 )  Alb: 3.8 g/dL / Pro: 6.4 g/dL / ALK PHOS: 102 U/L / ALT: 58 U/L / AST: 47 U/L / GGT: x           CARDIAC MARKERS ( 19 Jul 2019 16:50 )  x     / <0.01 ng/mL / x     / x     / x          Culture - Blood (collected 07-19-19 @ 16:50)  Source: .Blood Blood  Gram Stain (07-20-19 @ 10:47):    Growth in aerobic bottle: Gram Negative Rods    Growth in anaerobic bottle: Gram Negative Rods  Preliminary Report (07-20-19 @ 10:47):    Growth in aerobic bottle: Gram Negative Rods    Growth in anaerobic bottle: Gram Negative Rods    "Due to technical problems, Proteus sp. will Not be reported as part of    the BCID panel until further notice"    ***Blood Panel PCR results on this specimenare available    approximately 3 hours after the Gram stain result.***    Gram stain, PCR, and/or culture results may not always    correspond due to difference in methodologies.    ************************************************************    This PCR assaywas performed using [employer].    The following targets are tested for: Enterococcus,    vancomycin resistant enterococci, Listeria monocytogenes,    coagulase negative staphylococci, S. aureus,    methicillin resistant S. aureus, Streptococcus agalactiae    (Group B), S. pneumoniae, S. pyogenes (Group A),    Acinetobacter baumannii, Enterobacter cloacae, E. coli,    Klebsiella oxytoca, K. pneumoniae, Proteus sp.,    Serratia marcescens, Haemophilus influenzae,    Neisseria meningitidis, Pseudomonas aeruginosa, Candida    albicans, C. glabrata, C krusei, C parapsilosis,    C. tropicalis and the KPC resistance gene.    Culture - Blood (collected 07-19-19 @ 16:50)  Source: .Blood Blood  Gram Stain (07-20-19 @ 10:48):    Growth in anaerobic bottle: Gram Negative Rods    Growth in aerobic bottle: Gram Negative Rods  Preliminary Report (07-20-19 @ 10:48):    Growth in anaerobic bottle: Gram Negative Rods    Growth in aerobic bottle: Gram Negative Rods      Urinalysis Basic - ( 19 Jul 2019 22:12 )    Color: Yellow / Appearance: Clear / SG: <=1.005 / pH: x  Gluc: x / Ketone: Negative  / Bili: Negative / Urobili: 2.0 mg/dL   Blood: x / Protein: Negative mg/dL / Nitrite: Negative   Leuk Esterase: Negative / RBC: x / WBC x   Sq Epi: x / Non Sq Epi: x / Bacteria: x      RADIOLOGY & ADDITIONAL TESTS:  < from: US Abdomen Limited (07.19.19 @ 21:33) >  IMPRESSION:      Mild dilation of the common bile duct up to 7 mm.    Otherwise, essentially unremarkable right upper quadrant ultrasound.    < end of copied text >    < from: CT Angio Chest Dissection Protocol (07.19.19 @ 18:27) >  IMPRESSION:    1.  No aortic dissection.    2.  No evidence of any acute inflammatory process within the chest,   abdomen, or pelvis.    3.  Nonspecific dilatation of the main pulmonary trunk to 3.5 cm.    < end of copied text >    < from: Xray Chest 1 View-PORTABLE IMMEDIATE (07.19.19 @ 16:50) >  Impression:      No radiographic evidence of acute cardiopulmonary disease.    < end of copied text >      MEDICATIONS:  MEDICATIONS  (STANDING):  cefoTEtan  IVPB 2 Gram(s) IV Intermittent every 12 hours  dextrose 5% + sodium chloride 0.9%. 1000 milliLiter(s) (100 mL/Hr) IV Continuous <Continuous>  heparin  Injectable 5000 Unit(s) SubCutaneous every 12 hours  lisinopril 5 milliGRAM(s) Oral daily  pantoprazole  Injectable 40 milliGRAM(s) IV Push daily    MEDICATIONS  (PRN):  acetaminophen   Tablet .. 650 milliGRAM(s) Oral every 6 hours PRN Temp greater or equal to 38C (100.4F)  guaiFENesin    Syrup 100 milliGRAM(s) Oral every 6 hours PRN Cough  morphine  - Injectable 4 milliGRAM(s) IV Push every 4 hours PRN Severe Pain (7 - 10)      HEALTH ISSUES - PROBLEM Dx:  Epigastric pain: Epigastric pain  HTN (hypertension): HTN (hypertension)  Fever, unspecified fever cause: Fever, unspecified fever cause      Case discussed in details with: Dr. Joseph Velázquez     PRESSURE ULCERS                                                 NO  URETHRAL CATHETER                                            NO  CENTRAL VENOUS CATHETER/PICC LINE                 NO  SEPSIS                                                                  NO    Assessment and Plan:  Patient is a 49 y/o male with recent HIV+ and HepC+ blood exposure who presented to the ED on 7-19-19 with a week of fever and abdominal pain that was admitted to the inpatient service for further workup and management.     #1) Fever 2/2 bacteremia, GNR, E coli  - ID consultation . antibiotics changed to rocephin & flagyl  - Workup with 4th generation HIV1/2 immunoassay, Hepatitis panel, heterophile Ab test   - CBC, CMP, UA, urine cx   - 2x Blood culture showed gram negative rods , E coli , f/u sensitivity. repeat blood cx  - Acetaminophen 650 mg po q6h PRN temp >= 100.4F with 4g/day max     #2) Abdominal pain with diarrhea and vomiting   - GI ppx - continue pantoprazole 40mg IV qd   - Stool ova and parasites; stool culture   - Morphine 4mg IV q4h PRN for severe pain   - Replenish fluids with D5+1/2NS    #3) Dry cough   -- Guaifenesin 100mg po q6h PRN cough  CXR does not show any infiltrates.    #4) Hypocalcemia  - CMP ordered  - Ionized calcium ordered  - Monitor for symptoms     #5) Essential HTN - controlled  - c/w lisinopril    #6) Anxiety  - Continue trazodone       #Progress Note Handoff  Pending : cultures  Disposition:  Home when stable   Discussion: Discussed with patient   Discharge Disposition: Continues to require hospitalization. SARATHJAMAL  48y, Male  Allergy: penicillins (Unknown)      HPI:  · HPI Objective Statement: 48 year old male hx HTN presenting with 1 day of abdominal pain. Patient states he feels shortness of breath and has abdominal pain, fevers since sunday (tmax of 103), diaphoresis. Pain ranges from supra-umbilical, and RUQ. Bedside U/s revealed normal gallbladder. 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 He denies chest pain, vomiting, nausea, headache, back pain, neck pain. (19 Jul 2019 23:21)      OVERNIGHT EVENTS:  Patient was seen and evaluated at bedside. Patient discussed further about his history of present illness. He stated that on the day of his needle stick on 6-8-19, he went to [facility] and was prescribed Emtricitabine-Tenofovir and Raltegravir for 1 week for PEP. Afterwards, he was prescribed Biktarvy for 4 weeks however only took it for 2 weeks due to a delay in approval from his worker's comp for the second half of PEP. While on PEP, he experienced frequent diarrhea and vomiting and lost 20 lbs. 1 week ago, he started experiencing fevers with a Tmax of 103F, chills, and weakness. He also admitted to a dry cough for the last couple of days and a new episode of non bloody, watery diarrhea and vomiting that began yesterday. Today, patient felt afebrile however he had a fever of 101.2F last night.       PAST MEDICAL & SURGICAL HISTORY:  HTN (hypertension)  H/O left knee surgery      VITALS:  T(F): 98.1 (07-20-19 @ 05:21), Max: 101.2 (07-20-19 @ 02:19)  HR: 77 (07-20-19 @ 05:21)  BP: 120/65 (07-20-19 @ 05:21) (119/66 - 131/65)  RR: 18 (07-20-19 @ 05:21)  SpO2: 97% (07-20-19 @ 07:58)    General: WN/WD NAD  Neurology: A&Ox3, nonfocal  Eyes: PERRLA/ EOMI, Gross vision intact  ENT/Neck: Neck supple, trachea midline, No JVD, Gross hearing intact, + submandibular lymphadenopathy L>R  Respiratory: CTA B/L, No wheezing, rales, rhonchi  CV: RRR, S1S2, no murmurs, rubs or gallops  Abdominal: Soft, ND +BS, tender to palpation in bilateral upper quadrants, midline ventral hernia seen near umbilicus   Extremities: No edema, + peripheral pulses  Skin: No Rashes, Hematoma, Ecchymosis      TESTS & MEASUREMENTS:  Height (cm): 177.8 (07-20-19 @ 00:56)  Weight (kg): 96.4 (07-20-19 @ 00:56)  BMI (kg/m2): 30.5 (07-20-19 @ 00:56)                          13.3   5.55  )-----------( 144      ( 20 Jul 2019 06:51 )             39.0     PT/INR - ( 19 Jul 2019 16:50 )   PT: 14.60 sec;   INR: 1.27 ratio         PTT - ( 19 Jul 2019 16:50 )  PTT:31.3 sec  07-20    137  |  99  |  14  ----------------------------<  108<H>  4.0   |  28  |  1.0    Ca    7.8<L>      20 Jul 2019 06:51    TPro  6.4  /  Alb  3.8  /  TBili  1.2  /  DBili  0.4<H>  /  AST  47<H>  /  ALT  58<H>  /  AlkPhos  102  07-19    LIVER FUNCTIONS - ( 19 Jul 2019 16:50 )  Alb: 3.8 g/dL / Pro: 6.4 g/dL / ALK PHOS: 102 U/L / ALT: 58 U/L / AST: 47 U/L / GGT: x           CARDIAC MARKERS ( 19 Jul 2019 16:50 )  x     / <0.01 ng/mL / x     / x     / x          Culture - Blood (collected 07-19-19 @ 16:50)  Source: .Blood Blood  Gram Stain (07-20-19 @ 10:47):    Growth in aerobic bottle: Gram Negative Rods    Growth in anaerobic bottle: Gram Negative Rods  Preliminary Report (07-20-19 @ 10:47):    Growth in aerobic bottle: Gram Negative Rods    Growth in anaerobic bottle: Gram Negative Rods    "Due to technical problems, Proteus sp. will Not be reported as part of    the BCID panel until further notice"    ***Blood Panel PCR results on this specimenare available    approximately 3 hours after the Gram stain result.***    Gram stain, PCR, and/or culture results may not always    correspond due to difference in methodologies.    ************************************************************    This PCR assaywas performed using [employer].    The following targets are tested for: Enterococcus,    vancomycin resistant enterococci, Listeria monocytogenes,    coagulase negative staphylococci, S. aureus,    methicillin resistant S. aureus, Streptococcus agalactiae    (Group B), S. pneumoniae, S. pyogenes (Group A),    Acinetobacter baumannii, Enterobacter cloacae, E. coli,    Klebsiella oxytoca, K. pneumoniae, Proteus sp.,    Serratia marcescens, Haemophilus influenzae,    Neisseria meningitidis, Pseudomonas aeruginosa, Candida    albicans, C. glabrata, C krusei, C parapsilosis,    C. tropicalis and the KPC resistance gene.    Culture - Blood (collected 07-19-19 @ 16:50)  Source: .Blood Blood  Gram Stain (07-20-19 @ 10:48):    Growth in anaerobic bottle: Gram Negative Rods    Growth in aerobic bottle: Gram Negative Rods  Preliminary Report (07-20-19 @ 10:48):    Growth in anaerobic bottle: Gram Negative Rods    Growth in aerobic bottle: Gram Negative Rods      Urinalysis Basic - ( 19 Jul 2019 22:12 )    Color: Yellow / Appearance: Clear / SG: <=1.005 / pH: x  Gluc: x / Ketone: Negative  / Bili: Negative / Urobili: 2.0 mg/dL   Blood: x / Protein: Negative mg/dL / Nitrite: Negative   Leuk Esterase: Negative / RBC: x / WBC x   Sq Epi: x / Non Sq Epi: x / Bacteria: x      RADIOLOGY & ADDITIONAL TESTS:  < from: US Abdomen Limited (07.19.19 @ 21:33) >  IMPRESSION:      Mild dilation of the common bile duct up to 7 mm.    Otherwise, essentially unremarkable right upper quadrant ultrasound.    < end of copied text >    < from: CT Angio Chest Dissection Protocol (07.19.19 @ 18:27) >  IMPRESSION:    1.  No aortic dissection.    2.  No evidence of any acute inflammatory process within the chest,   abdomen, or pelvis.    3.  Nonspecific dilatation of the main pulmonary trunk to 3.5 cm.    < end of copied text >    < from: Xray Chest 1 View-PORTABLE IMMEDIATE (07.19.19 @ 16:50) >  Impression:      No radiographic evidence of acute cardiopulmonary disease.    < end of copied text >      MEDICATIONS:  MEDICATIONS  (STANDING):  cefoTEtan  IVPB 2 Gram(s) IV Intermittent every 12 hours  dextrose 5% + sodium chloride 0.9%. 1000 milliLiter(s) (100 mL/Hr) IV Continuous <Continuous>  heparin  Injectable 5000 Unit(s) SubCutaneous every 12 hours  lisinopril 5 milliGRAM(s) Oral daily  pantoprazole  Injectable 40 milliGRAM(s) IV Push daily    MEDICATIONS  (PRN):  acetaminophen   Tablet .. 650 milliGRAM(s) Oral every 6 hours PRN Temp greater or equal to 38C (100.4F)  guaiFENesin    Syrup 100 milliGRAM(s) Oral every 6 hours PRN Cough  morphine  - Injectable 4 milliGRAM(s) IV Push every 4 hours PRN Severe Pain (7 - 10)      HEALTH ISSUES - PROBLEM Dx:  Epigastric pain: Epigastric pain  HTN (hypertension): HTN (hypertension)  Fever, unspecified fever cause: Fever, unspecified fever cause      Case discussed in details with: Dr. Joseph Velázquez     PRESSURE ULCERS                                                 NO  URETHRAL CATHETER                                            NO  CENTRAL VENOUS CATHETER/PICC LINE                 NO  SEPSIS                                                                  NO    Assessment and Plan:  Patient is a 49 y/o male with recent HIV+ and HepC+ blood exposure who presented to the ED on 7-19-19 with a week of fever and abdominal pain that was admitted to the inpatient service for further workup and management.     #1) Fever 2/2 bacteremia, GNR, E coli  - ID consultation . antibiotics changed to rocephin & flagyl  - Workup with 4th generation HIV1/2 immunoassay, Hepatitis panel, heterophile Ab test   - CBC, CMP, UA, urine cx   - 2x Blood culture showed gram negative rods , E coli , f/u sensitivity. repeat blood cx  - Acetaminophen 650 mg po q6h PRN temp >= 100.4F with 4g/day max     #2) Abdominal pain with diarrhea and vomiting   - GI ppx - continue pantoprazole 40mg IV qd   - Stool ova and parasites; stool culture   - Morphine 4mg IV q4h PRN for severe pain   - Replenish fluids with D5+1/2NS    #3) Dry cough   -- Guaifenesin 100mg po q6h PRN cough  CXR does not show any infiltrates.    #4) Hypocalcemia  - asymtomatic  - Ionized calcium f/u  - Monitor for symptoms   - supplement as required    #5) Essential HTN - controlled  - c/w lisinopril    #6) Anxiety  - Continue trazodone       #Progress Note Handoff  Pending : cultures  Disposition:  Home when stable   Discussion: Discussed with patient   Discharge Disposition: Continues to require hospitalization.

## 2019-07-22 LAB
-  AMIKACIN: SIGNIFICANT CHANGE UP
-  AMPICILLIN/SULBACTAM: SIGNIFICANT CHANGE UP
-  AMPICILLIN: SIGNIFICANT CHANGE UP
-  AZTREONAM: SIGNIFICANT CHANGE UP
-  CEFAZOLIN: SIGNIFICANT CHANGE UP
-  CEFEPIME: SIGNIFICANT CHANGE UP
-  CEFOXITIN: SIGNIFICANT CHANGE UP
-  CEFTRIAXONE: SIGNIFICANT CHANGE UP
-  CIPROFLOXACIN: SIGNIFICANT CHANGE UP
-  ERTAPENEM: SIGNIFICANT CHANGE UP
-  GENTAMICIN: SIGNIFICANT CHANGE UP
-  IMIPENEM: SIGNIFICANT CHANGE UP
-  LEVOFLOXACIN: SIGNIFICANT CHANGE UP
-  MEROPENEM: SIGNIFICANT CHANGE UP
-  PIPERACILLIN/TAZOBACTAM: SIGNIFICANT CHANGE UP
-  TOBRAMYCIN: SIGNIFICANT CHANGE UP
-  TRIMETHOPRIM/SULFAMETHOXAZOLE: SIGNIFICANT CHANGE UP
ANION GAP SERPL CALC-SCNC: 9 MMOL/L — SIGNIFICANT CHANGE UP (ref 7–14)
BUN SERPL-MCNC: 11 MG/DL — SIGNIFICANT CHANGE UP (ref 10–20)
CALCIUM SERPL-MCNC: 7.9 MG/DL — LOW (ref 8.5–10.1)
CHLORIDE SERPL-SCNC: 101 MMOL/L — SIGNIFICANT CHANGE UP (ref 98–110)
CO2 SERPL-SCNC: 29 MMOL/L — SIGNIFICANT CHANGE UP (ref 17–32)
CREAT SERPL-MCNC: 0.8 MG/DL — SIGNIFICANT CHANGE UP (ref 0.7–1.5)
CULTURE RESULTS: SIGNIFICANT CHANGE UP
CULTURE RESULTS: SIGNIFICANT CHANGE UP
GLUCOSE SERPL-MCNC: 108 MG/DL — HIGH (ref 70–99)
HCT VFR BLD CALC: 37.8 % — LOW (ref 42–52)
HGB BLD-MCNC: 12.9 G/DL — LOW (ref 14–18)
MCHC RBC-ENTMCNC: 31.7 PG — HIGH (ref 27–31)
MCHC RBC-ENTMCNC: 34.1 G/DL — SIGNIFICANT CHANGE UP (ref 32–37)
MCV RBC AUTO: 92.9 FL — SIGNIFICANT CHANGE UP (ref 80–94)
METHOD TYPE: SIGNIFICANT CHANGE UP
NRBC # BLD: 0 /100 WBCS — SIGNIFICANT CHANGE UP (ref 0–0)
ORGANISM # SPEC MICROSCOPIC CNT: SIGNIFICANT CHANGE UP
PLATELET # BLD AUTO: 193 K/UL — SIGNIFICANT CHANGE UP (ref 130–400)
POTASSIUM SERPL-MCNC: 4.1 MMOL/L — SIGNIFICANT CHANGE UP (ref 3.5–5)
POTASSIUM SERPL-SCNC: 4.1 MMOL/L — SIGNIFICANT CHANGE UP (ref 3.5–5)
RBC # BLD: 4.07 M/UL — LOW (ref 4.7–6.1)
RBC # FLD: 11.4 % — LOW (ref 11.5–14.5)
SODIUM SERPL-SCNC: 139 MMOL/L — SIGNIFICANT CHANGE UP (ref 135–146)
SPECIMEN SOURCE: SIGNIFICANT CHANGE UP
SPECIMEN SOURCE: SIGNIFICANT CHANGE UP
WBC # BLD: 6.12 K/UL — SIGNIFICANT CHANGE UP (ref 4.8–10.8)
WBC # FLD AUTO: 6.12 K/UL — SIGNIFICANT CHANGE UP (ref 4.8–10.8)

## 2019-07-22 PROCEDURE — 99233 SBSQ HOSP IP/OBS HIGH 50: CPT

## 2019-07-22 RX ORDER — ONDANSETRON 8 MG/1
4 TABLET, FILM COATED ORAL ONCE
Refills: 0 | Status: COMPLETED | OUTPATIENT
Start: 2019-07-22 | End: 2019-07-22

## 2019-07-22 RX ADMIN — Medication 100 MILLIGRAM(S): at 13:32

## 2019-07-22 RX ADMIN — HEPARIN SODIUM 5000 UNIT(S): 5000 INJECTION INTRAVENOUS; SUBCUTANEOUS at 05:54

## 2019-07-22 RX ADMIN — HEPARIN SODIUM 5000 UNIT(S): 5000 INJECTION INTRAVENOUS; SUBCUTANEOUS at 17:20

## 2019-07-22 RX ADMIN — LISINOPRIL 5 MILLIGRAM(S): 2.5 TABLET ORAL at 05:54

## 2019-07-22 RX ADMIN — MORPHINE SULFATE 4 MILLIGRAM(S): 50 CAPSULE, EXTENDED RELEASE ORAL at 09:52

## 2019-07-22 RX ADMIN — Medication 100 MILLIGRAM(S): at 05:51

## 2019-07-22 RX ADMIN — CEFTRIAXONE 100 MILLIGRAM(S): 500 INJECTION, POWDER, FOR SOLUTION INTRAMUSCULAR; INTRAVENOUS at 17:20

## 2019-07-22 RX ADMIN — MORPHINE SULFATE 4 MILLIGRAM(S): 50 CAPSULE, EXTENDED RELEASE ORAL at 21:40

## 2019-07-22 RX ADMIN — MORPHINE SULFATE 4 MILLIGRAM(S): 50 CAPSULE, EXTENDED RELEASE ORAL at 10:52

## 2019-07-22 RX ADMIN — ONDANSETRON 4 MILLIGRAM(S): 8 TABLET, FILM COATED ORAL at 21:32

## 2019-07-22 RX ADMIN — Medication 100 MILLIGRAM(S): at 21:32

## 2019-07-22 RX ADMIN — PANTOPRAZOLE SODIUM 40 MILLIGRAM(S): 20 TABLET, DELAYED RELEASE ORAL at 11:36

## 2019-07-22 NOTE — PROGRESS NOTE ADULT - SUBJECTIVE AND OBJECTIVE BOX
Patient is seen and examined at the bed side, is afebrile.        REVIEW OF SYSTEMS: All other review systems are negative        Vital Signs Last 24 Hrs  T(C): 36.9 (22 Jul 2019 21:00), Max: 37 (22 Jul 2019 05:15)  T(F): 98.5 (22 Jul 2019 21:00), Max: 98.6 (22 Jul 2019 05:15)  HR: 65 (22 Jul 2019 21:00) (63 - 68)  BP: 129/82 (22 Jul 2019 21:00) (97/61 - 137/75)  BP(mean): --  RR: 16 (22 Jul 2019 21:00) (14 - 16)  SpO2: --        PHYSICAL EXAM:  GENERAL: NAD, well-groomed, well-developed  HEAD:  Atraumatic, Normocephalic  EYES: EOMI, PERRLA, conjunctiva and sclera clear  ENMT: No tonsillar erythema, exudates, or enlargement; Moist mucous membranes, Good dentition, No lesions  NECK: Supple, No JVD, Normal thyroid  NERVOUS SYSTEM:  Alert & Oriented X3, Good concentration; Motor Strength 5/5 B/L upper and lower extremities; DTRs 2+ intact and symmetric  CHEST/LUNG: Clear to percussion bilaterally; No rales, rhonchi, wheezing, or rubs  HEART: Regular rate and rhythm; No murmurs, rubs, or gallops  ABDOMEN: Soft, Nontender, Nondistended; Bowel sounds present  EXTREMITIES:  2+ Peripheral Pulses, No clubbing, cyanosis, or edema  LYMPH: No lymphadenopathy noted  SKIN: No rashes or lesions      MEDICATIONS  (STANDING):  cefTRIAXone   IVPB 2000 milliGRAM(s) IV Intermittent every 24 hours  dextrose 5% + sodium chloride 0.9%. 1000 milliLiter(s) (100 mL/Hr) IV Continuous <Continuous>  heparin  Injectable 5000 Unit(s) SubCutaneous every 12 hours  lisinopril 5 milliGRAM(s) Oral daily  metroNIDAZOLE  IVPB 500 milliGRAM(s) IV Intermittent every 8 hours  ondansetron Injectable 4 milliGRAM(s) IV Push once  pantoprazole  Injectable 40 milliGRAM(s) IV Push daily    MEDICATIONS  (PRN):  acetaminophen   Tablet .. 650 milliGRAM(s) Oral every 6 hours PRN Temp greater or equal to 38C (100.4F)  morphine  - Injectable 4 milliGRAM(s) IV Push every 4 hours PRN Severe Pain (7 - 10)        Allergies    penicillins (Unknown)    Intolerances            LABS:                        12.9   6.12  )-----------( 193      ( 22 Jul 2019 06:48 )             37.8         07-22    139  |  101  |  11  ----------------------------<  108<H>  4.1   |  29  |  0.8    Ca    7.9<L>      22 Jul 2019 06:48          RADIOLOGY & ADDITIONAL TESTS:    < from: US Abdomen Limited (07.19.19 @ 21:33) >    Mild dilation of the common bile duct up to 7 mm.    Otherwise, essentially unremarkable right upper quadrant ultrasound.    < end of copied text >      MICROBIOLOGY DATA:    Clostridium difficile Toxin by PCR (07.21.19 @ 09:15)    Clostridium difficile Toxin by PCR: RESULT INTERPRETATION:    Not Detected - No Clostridium difficile toxins detected by amplified DNA  PCR    C. difficile PCR test results should be interpreted only with  consideration of the patient's clinical situation and history.  This test  will detect the presence of toxigenic C. difficle.  However it cannot be  used as the sole criteria  for the diagnosis of antibiotic associated diarrhea, antibiotic  associated colitis, or pseudomembranous colitis.  Colonization with  C.difficile may exceed 20% in hospital patients, the majority of whom are  without Toxigenic  Clostridium Diffidisease.  Testing is generally not recommended in  children below the age of 1 year, as up to half of healthy infants are  asymptomatically colonized withC.difficile.  In addition, C.difficile  PCR testing  cannot be used as a "test of cure" as dead organism nucleic acids will  persist and be detected after treatment.  Successful treatment of  C.difficile disease is determined by resolution of clinical symptoms. Method: [facility]  TOXIGENIC CLOST.DIFFICILE NEGATIVE;  027-NAP1-B1 PRESUMPTIVE NEGATIVE.  By: Real-Time PCR (Polymerase Chain Reaction)  NOTE: This method detects the Toxin B gene (tCdB), the  binary toxin gene (CDT), and the single-base-pair  deletion at nucleotide 117 within the gene encoding  a negative regulator of toxin production (tcdC 117).  The combined presence of genes encoding Toxin B and  binary toxin and the tcdC 117 deletion has been  associated with hypervirulent C. difficile strain  known as 027/NAP1/B1, which has been associated with  severe disease outbreaks in healthcare facilities  worldwide.    C Diff by PCR Result: Negative      HIV-1/2 Antigen/Antibody Screen by CMIA (07.20.19 @ 16:05)    HIV-1/2 Combo Result: Nonreact: The HIV Ag/Ab Combo test performed screens for HIV-1 p24 antigen,  antibodies to HIV-1 (group M and group O), and antibodies to HIV-2. All  specimens repeatedly reactive will reflex to an HIV 1/2 antibody  confirmation and differentiation test. This assay detects p24 antigen  which may be present prior to the development of HIV antibodies,  therefore a reactive result with a negative HIV 1/2 AB Confirmation  should be followed up with HIV-1 RNA, HIV-2 RNA and repeat testing in 4-8  weeks. A nonreactive result does not preclude previous exposure to or  infection with HIV-1 or HIV-2. [facility] prohibits disclosure of this  result to any unauthorized party.      Culture - Blood (07.19.19 @ 16:50)    Gram Stain:   Growth in aerobic bottle: Gram Negative Rods  Growth in anaerobic bottle: Gram Negative Rods    -  Amikacin: S <=8    -  Ampicillin: R >16 These ampicillin results predict results for amoxicillin    -  Ampicillin/Sulbactam: I 16/8 Enterobacter, Citrobacter, and Serratia may develop resistance during prolonged therapy (3-4 days)    -  Aztreonam: S <=4    -  Cefazolin: S <=2 Enterobacter, Citrobacter, and Serratia may develop resistance during prolonged therapy (3-4 days)    -  Cefepime: S <=2    -  Cefoxitin: S <=4    -  Ceftriaxone: S <=1 Enterobacter, Citrobacter, and Serratia may develop resistance during prolonged therapy    -  Ciprofloxacin: R >2    -  Ertapenem: S <=0.5    -  Gentamicin: S <=1    -  Imipenem: S <=1    -  Levofloxacin: R >4    -  Meropenem: S <=1    -  Piperacillin/Tazobactam: S <=8    -  Tobramycin: S <=2    -  Trimethoprim/Sulfamethoxazole: R >2/38    -  Escherichia coli: Detec    Specimen Source: .Blood Blood    Organism: Blood Culture PCR    Organism: Escherichia coli    Culture Results:   Growth in aerobic and anaerobic bottles: Escherichia coli  "Due to technical problems, Proteus sp. will Not be reported as part of  the BCID panel until further notice"  ***Blood Panel PCR results on this specimen are available  approximately 3 hours after the Gram stain result.***  Gram stain, PCR, and/or culture results may not always  correspond due to difference in methodologies.  ************************************************************  This PCR assay was performed using [employer].  The following targets are tested for: Enterococcus,  vancomycin resistant enterococci, Listeria monocytogenes,  coagulase negative staphylococci, S. aureus,  methicillin resistant S. aureus, Streptococcus agalactiae  (Group B), S. pneumoniae, S.pyogenes (Group A),  Acinetobacter baumannii, Enterobacter cloacae, E. coli,  Klebsiella oxytoca, K. pneumoniae, Proteus sp.,  Serratia marcescens, Haemophilus influenzae,  Neisseria meningitidis, Pseudomonas aeruginosa, Candida  albicans, C. glabrata, C krusei, C parapsilosis,  C. tropicalis and the KPC resistance gene.    Organism Identification: Blood Culture PCR  Escherichia coli    Method Type: PCR    Method Type: ALPA Patient is seen and examined at the bed side, is afebrile. He is c/o feeling nauseated.         REVIEW OF SYSTEMS: All other review systems are negative        Vital Signs Last 24 Hrs  T(C): 36.9 (22 Jul 2019 21:00), Max: 37 (22 Jul 2019 05:15)  T(F): 98.5 (22 Jul 2019 21:00), Max: 98.6 (22 Jul 2019 05:15)  HR: 65 (22 Jul 2019 21:00) (63 - 68)  BP: 129/82 (22 Jul 2019 21:00) (97/61 - 137/75)  BP(mean): --  RR: 16 (22 Jul 2019 21:00) (14 - 16)  SpO2: --        PHYSICAL EXAM:  GENERAL: Not in distress  CHEST/LUNG: Air entry  bilaterally  HEART: s1 and s2 present   ABDOMEN: Nontender and  Nondistended  EXTREMITIES:  No pedal edema  CNS: Awake and Alert          MEDICATIONS  (STANDING):  cefTRIAXone   IVPB 2000 milliGRAM(s) IV Intermittent every 24 hours  dextrose 5% + sodium chloride 0.9%. 1000 milliLiter(s) (100 mL/Hr) IV Continuous <Continuous>  heparin  Injectable 5000 Unit(s) SubCutaneous every 12 hours  lisinopril 5 milliGRAM(s) Oral daily  metroNIDAZOLE  IVPB 500 milliGRAM(s) IV Intermittent every 8 hours  ondansetron Injectable 4 milliGRAM(s) IV Push once  pantoprazole  Injectable 40 milliGRAM(s) IV Push daily    MEDICATIONS  (PRN):  acetaminophen   Tablet .. 650 milliGRAM(s) Oral every 6 hours PRN Temp greater or equal to 38C (100.4F)  morphine  - Injectable 4 milliGRAM(s) IV Push every 4 hours PRN Severe Pain (7 - 10)        Allergies    penicillins (Unknown)        LABS:                        12.9   6.12  )-----------( 193      ( 22 Jul 2019 06:48 )             37.8         07-22    139  |  101  |  11  ----------------------------<  108<H>  4.1   |  29  |  0.8    Ca    7.9<L>      22 Jul 2019 06:48          RADIOLOGY & ADDITIONAL TESTS:    < from: US Abdomen Limited (07.19.19 @ 21:33) >    Mild dilation of the common bile duct up to 7 mm.    Otherwise, essentially unremarkable right upper quadrant ultrasound.    < end of copied text >        MICROBIOLOGY DATA:    Clostridium difficile Toxin by PCR (07.21.19 @ 09:15)    Clostridium difficile Toxin by PCR: RESULT INTERPRETATION:    Not Detected - No Clostridium difficile toxins detected by amplified DNA  PCR    C. difficile PCR test results should be interpreted only with  consideration of the patient's clinical situation and history.  This test  will detect the presence of toxigenic C. difficle.  However it cannot be  used as the sole criteria  for the diagnosis of antibiotic associated diarrhea, antibiotic  associated colitis, or pseudomembranous colitis.  Colonization with  C.difficile may exceed 20% in hospital patients, the majority of whom are  without Toxigenic  Clostridium Diffidisease.  Testing is generally not recommended in  children below the age of 1 year, as up to half of healthy infants are  asymptomatically colonized withC.difficile.  In addition, C.difficile  PCR testing  cannot be used as a "test of cure" as dead organism nucleic acids will  persist and be detected after treatment.  Successful treatment of  C.difficile disease is determined by resolution of clinical symptoms. Method: CDPCR  TOXIGENIC CLOST.DIFFICILE NEGATIVE;  027-NAP1-B1 PRESUMPTIVE NEGATIVE.  By: Real-Time PCR (Polymerase Chain Reaction)  NOTE: This method detects the Toxin B gene (tCdB), the  binary toxin gene (CDT), and the single-base-pair  deletion at nucleotide 117 within the gene encoding  a negative regulator of toxin production (tcdC 117).  The combined presence of genes encoding Toxin B and  binary toxin and the tcdC 117 deletion has been  associated with hypervirulent C. difficile strain  known as 027/NAP1/B1, which has been associated with  severe disease outbreaks in healthcare facilities  worldwide.    C Diff by PCR Result: Negative      HIV-1/2 Antigen/Antibody Screen by CMIA (07.20.19 @ 16:05)    HIV-1/2 Combo Result: Nonreact: The HIV Ag/Ab Combo test performed screens for HIV-1 p24 antigen,  antibodies to HIV-1 (group M and group O), and antibodies to HIV-2. All  specimens repeatedly reactive will reflex to an HIV 1/2 antibody  confirmation and differentiation test. This assay detects p24 antigen  which may be present prior to the development of HIV antibodies,  therefore a reactive result with a negative HIV 1/2 AB Confirmation  should be followed up with HIV-1 RNA, HIV-2 RNA and repeat testing in 4-8  weeks. A nonreactive result does not preclude previous exposure to or  infection with HIV-1 or HIV-2. [facility] prohibits disclosure of this  result to any unauthorized party.      Culture - Blood (07.19.19 @ 16:50)    Gram Stain:   Growth in aerobic bottle: Gram Negative Rods  Growth in anaerobic bottle: Gram Negative Rods    -  Amikacin: S <=8    -  Ampicillin: R >16 These ampicillin results predict results for amoxicillin    -  Ampicillin/Sulbactam: I 16/8 Enterobacter, Citrobacter, and Serratia may develop resistance during prolonged therapy (3-4 days)    -  Aztreonam: S <=4    -  Cefazolin: S <=2 Enterobacter, Citrobacter, and Serratia may develop resistance during prolonged therapy (3-4 days)    -  Cefepime: S <=2    -  Cefoxitin: S <=4    -  Ceftriaxone: S <=1 Enterobacter, Citrobacter, and Serratia may develop resistance during prolonged therapy    -  Ciprofloxacin: R >2    -  Ertapenem: S <=0.5    -  Gentamicin: S <=1    -  Imipenem: S <=1    -  Levofloxacin: R >4    -  Meropenem: S <=1    -  Piperacillin/Tazobactam: S <=8    -  Tobramycin: S <=2    -  Trimethoprim/Sulfamethoxazole: R >2/38    -  Escherichia coli: Detec    Specimen Source: .Blood Blood    Organism: Blood Culture PCR    Organism: Escherichia coli    Culture Results:   Growth in aerobic and anaerobic bottles: Escherichia coli  "Due to technical problems, Proteus sp. will Not be reported as part of  the BCID panel until further notice"  ***Blood Panel PCR results on this specimen are available  approximately 3 hours after the Gram stain result.***  Gram stain, PCR, and/or culture results may not always  correspond due to difference in methodologies.  ************************************************************  This PCR assay was performed using [employer].  The following targets are tested for: Enterococcus,  vancomycin resistant enterococci, Listeria monocytogenes,  coagulase negative staphylococci, S. aureus,  methicillin resistant S. aureus, Streptococcus agalactiae  (Group B), S. pneumoniae, S.pyogenes (Group A),  Acinetobacter baumannii, Enterobacter cloacae, E. coli,  Klebsiella oxytoca, K. pneumoniae, Proteus sp.,  Serratia marcescens, Haemophilus influenzae,  Neisseria meningitidis, Pseudomonas aeruginosa, Candida  albicans, C. glabrata, C krusei, C parapsilosis,  C. tropicalis and the KPC resistance gene.    Organism Identification: Blood Culture PCR  Escherichia coli    Method Type: PCR    Method Type: ALPA

## 2019-07-22 NOTE — PROGRESS NOTE ADULT - SUBJECTIVE AND OBJECTIVE BOX
SARATHJAMAL  48y, Male  Allergy: penicillins (Unknown)      HPI:  · HPI Objective Statement: 48 year old male hx HTN presenting with 1 day of abdominal pain. Patient states he feels shortness of breath and has abdominal pain, fevers since sunday (tmax of 103), diaphoresis. Pain ranges from supra-umbilical, and RUQ. Bedside U/s revealed normal gallbladder. 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 He denies chest pain, vomiting, nausea, headache, back pain, neck pain. (19 Jul 2019 23:21)      OVERNIGHT EVENTS:  Patient was seen and evaluated at bedside. Patient discussed further about his history of present illness. He stated that on the day of his needle stick on 6-8-19, he went to [facility] and was prescribed Emtricitabine-Tenofovir and Raltegravir for 1 week for PEP. Afterwards, he was prescribed Biktarvy for 4 weeks however only took it for 2 weeks due to a delay in approval from his worker's comp for the second half of PEP. While on PEP, he experienced frequent diarrhea and vomiting and lost 20 lbs. 1 week ago, he started experiencing fevers with a Tmax of 103F, chills, and weakness. He also admitted to a dry cough for the last couple of days and a new episode of non bloody, watery diarrhea and vomiting that began yesterday. Today, patient felt afebrile however he had a fever of 101.2F last night.       PAST MEDICAL & SURGICAL HISTORY:  HTN (hypertension)  H/O left knee surgery      VITALS:  T(F): 98.1 (07-20-19 @ 05:21), Max: 101.2 (07-20-19 @ 02:19)  HR: 77 (07-20-19 @ 05:21)  BP: 120/65 (07-20-19 @ 05:21) (119/66 - 131/65)  RR: 18 (07-20-19 @ 05:21)  SpO2: 97% (07-20-19 @ 07:58)    General: WN/WD NAD  Neurology: A&Ox3, nonfocal  Eyes: PERRLA/ EOMI, Gross vision intact  ENT/Neck: Neck supple, trachea midline, No JVD, Gross hearing intact, + submandibular lymphadenopathy L>R  Respiratory: CTA B/L, No wheezing, rales, rhonchi  CV: RRR, S1S2, no murmurs, rubs or gallops  Abdominal: Soft, ND +BS, tender to palpation in bilateral upper quadrants, midline ventral hernia seen near umbilicus   Extremities: No edema, + peripheral pulses  Skin: No Rashes, Hematoma, Ecchymosis      TESTS & MEASUREMENTS:  Height (cm): 177.8 (07-20-19 @ 00:56)  Weight (kg): 96.4 (07-20-19 @ 00:56)  BMI (kg/m2): 30.5 (07-20-19 @ 00:56)                          13.3   5.55  )-----------( 144      ( 20 Jul 2019 06:51 )             39.0     PT/INR - ( 19 Jul 2019 16:50 )   PT: 14.60 sec;   INR: 1.27 ratio         PTT - ( 19 Jul 2019 16:50 )  PTT:31.3 sec  07-20    137  |  99  |  14  ----------------------------<  108<H>  4.0   |  28  |  1.0    Ca    7.8<L>      20 Jul 2019 06:51    TPro  6.4  /  Alb  3.8  /  TBili  1.2  /  DBili  0.4<H>  /  AST  47<H>  /  ALT  58<H>  /  AlkPhos  102  07-19    LIVER FUNCTIONS - ( 19 Jul 2019 16:50 )  Alb: 3.8 g/dL / Pro: 6.4 g/dL / ALK PHOS: 102 U/L / ALT: 58 U/L / AST: 47 U/L / GGT: x           CARDIAC MARKERS ( 19 Jul 2019 16:50 )  x     / <0.01 ng/mL / x     / x     / x          Culture - Blood (collected 07-19-19 @ 16:50)  Source: .Blood Blood  Gram Stain (07-20-19 @ 10:47):    Growth in aerobic bottle: Gram Negative Rods    Growth in anaerobic bottle: Gram Negative Rods  Preliminary Report (07-20-19 @ 10:47):    Growth in aerobic bottle: Gram Negative Rods    Growth in anaerobic bottle: Gram Negative Rods    "Due to technical problems, Proteus sp. will Not be reported as part of    the BCID panel until further notice"    ***Blood Panel PCR results on this specimenare available    approximately 3 hours after the Gram stain result.***    Gram stain, PCR, and/or culture results may not always    correspond due to difference in methodologies.    ************************************************************    This PCR assaywas performed using [employer].    The following targets are tested for: Enterococcus,    vancomycin resistant enterococci, Listeria monocytogenes,    coagulase negative staphylococci, S. aureus,    methicillin resistant S. aureus, Streptococcus agalactiae    (Group B), S. pneumoniae, S. pyogenes (Group A),    Acinetobacter baumannii, Enterobacter cloacae, E. coli,    Klebsiella oxytoca, K. pneumoniae, Proteus sp.,    Serratia marcescens, Haemophilus influenzae,    Neisseria meningitidis, Pseudomonas aeruginosa, Candida    albicans, C. glabrata, C krusei, C parapsilosis,    C. tropicalis and the KPC resistance gene.    Culture - Blood (collected 07-19-19 @ 16:50)  Source: .Blood Blood  Gram Stain (07-20-19 @ 10:48):    Growth in anaerobic bottle: Gram Negative Rods    Growth in aerobic bottle: Gram Negative Rods  Preliminary Report (07-20-19 @ 10:48):    Growth in anaerobic bottle: Gram Negative Rods    Growth in aerobic bottle: Gram Negative Rods      Urinalysis Basic - ( 19 Jul 2019 22:12 )    Color: Yellow / Appearance: Clear / SG: <=1.005 / pH: x  Gluc: x / Ketone: Negative  / Bili: Negative / Urobili: 2.0 mg/dL   Blood: x / Protein: Negative mg/dL / Nitrite: Negative   Leuk Esterase: Negative / RBC: x / WBC x   Sq Epi: x / Non Sq Epi: x / Bacteria: x      RADIOLOGY & ADDITIONAL TESTS:  < from: US Abdomen Limited (07.19.19 @ 21:33) >  IMPRESSION:      Mild dilation of the common bile duct up to 7 mm.    Otherwise, essentially unremarkable right upper quadrant ultrasound.    < end of copied text >    < from: CT Angio Chest Dissection Protocol (07.19.19 @ 18:27) >  IMPRESSION:    1.  No aortic dissection.    2.  No evidence of any acute inflammatory process within the chest,   abdomen, or pelvis.    3.  Nonspecific dilatation of the main pulmonary trunk to 3.5 cm.    < end of copied text >    < from: Xray Chest 1 View-PORTABLE IMMEDIATE (07.19.19 @ 16:50) >  Impression:      No radiographic evidence of acute cardiopulmonary disease.    < end of copied text >      MEDICATIONS:  MEDICATIONS  (STANDING):  cefoTEtan  IVPB 2 Gram(s) IV Intermittent every 12 hours  dextrose 5% + sodium chloride 0.9%. 1000 milliLiter(s) (100 mL/Hr) IV Continuous <Continuous>  heparin  Injectable 5000 Unit(s) SubCutaneous every 12 hours  lisinopril 5 milliGRAM(s) Oral daily  pantoprazole  Injectable 40 milliGRAM(s) IV Push daily    MEDICATIONS  (PRN):  acetaminophen   Tablet .. 650 milliGRAM(s) Oral every 6 hours PRN Temp greater or equal to 38C (100.4F)  guaiFENesin    Syrup 100 milliGRAM(s) Oral every 6 hours PRN Cough  morphine  - Injectable 4 milliGRAM(s) IV Push every 4 hours PRN Severe Pain (7 - 10)      HEALTH ISSUES - PROBLEM Dx:  Epigastric pain: Epigastric pain  HTN (hypertension): HTN (hypertension)  Fever, unspecified fever cause: Fever, unspecified fever cause      Case discussed in details with: Dr. Joseph Velázquez     PRESSURE ULCERS                                                 NO  URETHRAL CATHETER                                            NO  CENTRAL VENOUS CATHETER/PICC LINE                 NO  SEPSIS                                                                  NO    Assessment and Plan:  Patient is a 47 y/o male with recent HIV+ and HepC+ blood exposure who presented to the ED on 7-19-19 with a week of fever and abdominal pain that was admitted to the inpatient service for further workup and management.     #1) Fever 2/2 bacteremia, GNR, E coli  - ID consultation . antibiotics changed to rocephin & flagyl  - Workup with 4th generation HIV1/2 immunoassay, Hepatitis panel, heterophile Ab test   - CBC, CMP, UA, urine cx   - 2x Blood culture showed gram negative rods , E coli , f/u sensitivity. repeat blood cx  - Acetaminophen 650 mg po q6h PRN temp >= 100.4F with 4g/day max     #2) Abdominal pain with diarrhea and vomiting   - GI ppx - continue pantoprazole 40mg IV qd   - Stool ova and parasites; stool culture   - Morphine 4mg IV q4h PRN for severe pain   - Replenish fluids with D5+1/2NS    #3) Dry cough   -- Guaifenesin 100mg po q6h PRN cough  CXR does not show any infiltrates.    #4) Hypocalcemia  - asymtomatic  - Ionized calcium f/u  - Monitor for symptoms   - supplement as required    #5) Essential HTN - controlled  - c/w lisinopril    #6) Anxiety  - Continue trazodone       #Progress Note Handoff  Pending : cultures  Disposition:  Home when stable   Discussion: Discussed with patient   Discharge Disposition: Not yet ready for discharge

## 2019-07-22 NOTE — PROGRESS NOTE ADULT - ASSESSMENT
SIRS response with GNR bacteremia ( anaerobe ) with possibly translocation from the GI tract.  This has little to do with his recent ABx but would rule out CD colitis  CT chest/abd/pelvis with no pathology  No acute appendicitis/cholangitis  Clinically no peritonitis      would recommend:    1. Continue  Ceftriaxone 2 gm iv q24h  2. Discontinue Flagyl 500 mg iv q8h    -will follow up # E.coli   bacteremia ( anaerobe ) with possibly translocation from the GI tract.        would recommend:    1. Continue  Ceftriaxone 2 gm iv q24h  2. Discontinue Flagyl 500 mg iv q8h  3. Need 14 days of IV Abx  4. Continue Anti emesis agents    -will follow up

## 2019-07-23 DIAGNOSIS — R78.81 BACTEREMIA: ICD-10-CM

## 2019-07-23 LAB
ANION GAP SERPL CALC-SCNC: 8 MMOL/L — SIGNIFICANT CHANGE UP (ref 7–14)
BUN SERPL-MCNC: 12 MG/DL — SIGNIFICANT CHANGE UP (ref 10–20)
CALCIUM SERPL-MCNC: 8 MG/DL — LOW (ref 8.5–10.1)
CHLORIDE SERPL-SCNC: 102 MMOL/L — SIGNIFICANT CHANGE UP (ref 98–110)
CO2 SERPL-SCNC: 30 MMOL/L — SIGNIFICANT CHANGE UP (ref 17–32)
CREAT SERPL-MCNC: 0.9 MG/DL — SIGNIFICANT CHANGE UP (ref 0.7–1.5)
GLUCOSE SERPL-MCNC: 116 MG/DL — HIGH (ref 70–99)
HCT VFR BLD CALC: 38.1 % — LOW (ref 42–52)
HGB BLD-MCNC: 12.9 G/DL — LOW (ref 14–18)
MCHC RBC-ENTMCNC: 31.5 PG — HIGH (ref 27–31)
MCHC RBC-ENTMCNC: 33.9 G/DL — SIGNIFICANT CHANGE UP (ref 32–37)
MCV RBC AUTO: 93.2 FL — SIGNIFICANT CHANGE UP (ref 80–94)
NRBC # BLD: 0 /100 WBCS — SIGNIFICANT CHANGE UP (ref 0–0)
PLATELET # BLD AUTO: 232 K/UL — SIGNIFICANT CHANGE UP (ref 130–400)
POTASSIUM SERPL-MCNC: 4.9 MMOL/L — SIGNIFICANT CHANGE UP (ref 3.5–5)
POTASSIUM SERPL-SCNC: 4.9 MMOL/L — SIGNIFICANT CHANGE UP (ref 3.5–5)
RBC # BLD: 4.09 M/UL — LOW (ref 4.7–6.1)
RBC # FLD: 11.3 % — LOW (ref 11.5–14.5)
SODIUM SERPL-SCNC: 140 MMOL/L — SIGNIFICANT CHANGE UP (ref 135–146)
WBC # BLD: 5.89 K/UL — SIGNIFICANT CHANGE UP (ref 4.8–10.8)
WBC # FLD AUTO: 5.89 K/UL — SIGNIFICANT CHANGE UP (ref 4.8–10.8)

## 2019-07-23 PROCEDURE — 99233 SBSQ HOSP IP/OBS HIGH 50: CPT

## 2019-07-23 RX ORDER — ACETAMINOPHEN 500 MG
2 TABLET ORAL
Qty: 0 | Refills: 0 | DISCHARGE
Start: 2019-07-23

## 2019-07-23 RX ORDER — LACTOBACILLUS ACIDOPHILUS 100MM CELL
1 CAPSULE ORAL THREE TIMES A DAY
Refills: 0 | Status: DISCONTINUED | OUTPATIENT
Start: 2019-07-23 | End: 2019-07-24

## 2019-07-23 RX ORDER — LOPERAMIDE HCL 2 MG
2 TABLET ORAL
Refills: 0 | Status: DISCONTINUED | OUTPATIENT
Start: 2019-07-23 | End: 2019-07-24

## 2019-07-23 RX ORDER — LACTOBACILLUS ACIDOPHILUS 100MM CELL
1 CAPSULE ORAL
Qty: 30 | Refills: 0
Start: 2019-07-23 | End: 2019-08-01

## 2019-07-23 RX ORDER — VANCOMYCIN HCL 1 G
1 VIAL (EA) INTRAVENOUS
Qty: 0 | Refills: 0 | DISCHARGE

## 2019-07-23 RX ORDER — CEFUROXIME AXETIL 250 MG
1 TABLET ORAL
Qty: 20 | Refills: 0
Start: 2019-07-23 | End: 2019-08-01

## 2019-07-23 RX ORDER — CEFUROXIME AXETIL 250 MG
500 TABLET ORAL EVERY 12 HOURS
Refills: 0 | Status: DISCONTINUED | OUTPATIENT
Start: 2019-07-23 | End: 2019-07-24

## 2019-07-23 RX ADMIN — Medication 500 MILLIGRAM(S): at 19:35

## 2019-07-23 RX ADMIN — LISINOPRIL 5 MILLIGRAM(S): 2.5 TABLET ORAL at 06:06

## 2019-07-23 RX ADMIN — Medication 1 TABLET(S): at 14:46

## 2019-07-23 RX ADMIN — Medication 1 TABLET(S): at 21:06

## 2019-07-23 RX ADMIN — PANTOPRAZOLE SODIUM 40 MILLIGRAM(S): 20 TABLET, DELAYED RELEASE ORAL at 19:35

## 2019-07-23 RX ADMIN — MORPHINE SULFATE 4 MILLIGRAM(S): 50 CAPSULE, EXTENDED RELEASE ORAL at 21:05

## 2019-07-23 RX ADMIN — Medication 2 MILLIGRAM(S): at 14:45

## 2019-07-23 NOTE — PROGRESS NOTE ADULT - SUBJECTIVE AND OBJECTIVE BOX
SARATHJAMAL  48y, Male  Allergy: penicillins (Unknown)      HPI:  · HPI Objective Statement: 48 year old male hx HTN presenting with 1 day of abdominal pain. Patient states he feels shortness of breath and has abdominal pain, fevers since sunday (tmax of 103), diaphoresis. Pain ranges from supra-umbilical, and RUQ. Bedside U/s revealed normal gallbladder. 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 He denies chest pain, vomiting, nausea, headache, back pain, neck pain. (19 Jul 2019 23:21)      OVERNIGHT EVENTS:  Patient was seen and evaluated at bedside. Patient discussed further about his history of present illness. He stated that on the day of his needle stick on 6-8-19, he went to [facility] and was prescribed Emtricitabine-Tenofovir and Raltegravir for 1 week for PEP. Afterwards, he was prescribed Biktarvy for 4 weeks however only took it for 2 weeks due to a delay in approval from his worker's comp for the second half of PEP. While on PEP, he experienced frequent diarrhea and vomiting and lost 20 lbs. 1 week ago, he started experiencing fevers with a Tmax of 103F, chills, and weakness. He also admitted to a dry cough for the last couple of days and a new episode of non bloody, watery diarrhea and vomiting that began yesterday. Today, patient felt afebrile however he had a fever of 101.2F last night.       PAST MEDICAL & SURGICAL HISTORY:  HTN (hypertension)  H/O left knee surgery      VITALS:  T(F): 98.1 (07-20-19 @ 05:21), Max: 101.2 (07-20-19 @ 02:19)  HR: 77 (07-20-19 @ 05:21)  BP: 120/65 (07-20-19 @ 05:21) (119/66 - 131/65)  RR: 18 (07-20-19 @ 05:21)  SpO2: 97% (07-20-19 @ 07:58)    General: WN/WD NAD  Neurology: A&Ox3, nonfocal  Eyes: PERRLA/ EOMI, Gross vision intact  ENT/Neck: Neck supple, trachea midline, No JVD, Gross hearing intact, + submandibular lymphadenopathy L>R  Respiratory: CTA B/L, No wheezing, rales, rhonchi  CV: RRR, S1S2, no murmurs, rubs or gallops  Abdominal: Soft, ND +BS, tender to palpation in bilateral upper quadrants, midline ventral hernia seen near umbilicus   Extremities: No edema, + peripheral pulses  Skin: No Rashes, Hematoma, Ecchymosis      TESTS & MEASUREMENTS:  Height (cm): 177.8 (07-20-19 @ 00:56)  Weight (kg): 96.4 (07-20-19 @ 00:56)  BMI (kg/m2): 30.5 (07-20-19 @ 00:56)                          13.3   5.55  )-----------( 144      ( 20 Jul 2019 06:51 )             39.0     PT/INR - ( 19 Jul 2019 16:50 )   PT: 14.60 sec;   INR: 1.27 ratio         PTT - ( 19 Jul 2019 16:50 )  PTT:31.3 sec  07-20    137  |  99  |  14  ----------------------------<  108<H>  4.0   |  28  |  1.0    Ca    7.8<L>      20 Jul 2019 06:51    TPro  6.4  /  Alb  3.8  /  TBili  1.2  /  DBili  0.4<H>  /  AST  47<H>  /  ALT  58<H>  /  AlkPhos  102  07-19    LIVER FUNCTIONS - ( 19 Jul 2019 16:50 )  Alb: 3.8 g/dL / Pro: 6.4 g/dL / ALK PHOS: 102 U/L / ALT: 58 U/L / AST: 47 U/L / GGT: x           CARDIAC MARKERS ( 19 Jul 2019 16:50 )  x     / <0.01 ng/mL / x     / x     / x          Culture - Blood (collected 07-19-19 @ 16:50)  Source: .Blood Blood  Gram Stain (07-20-19 @ 10:47):    Growth in aerobic bottle: Gram Negative Rods    Growth in anaerobic bottle: Gram Negative Rods  Preliminary Report (07-20-19 @ 10:47):    Growth in aerobic bottle: Gram Negative Rods    Growth in anaerobic bottle: Gram Negative Rods    "Due to technical problems, Proteus sp. will Not be reported as part of    the BCID panel until further notice"    ***Blood Panel PCR results on this specimenare available    approximately 3 hours after the Gram stain result.***    Gram stain, PCR, and/or culture results may not always    correspond due to difference in methodologies.    ************************************************************    This PCR assaywas performed using [employer].    The following targets are tested for: Enterococcus,    vancomycin resistant enterococci, Listeria monocytogenes,    coagulase negative staphylococci, S. aureus,    methicillin resistant S. aureus, Streptococcus agalactiae    (Group B), S. pneumoniae, S. pyogenes (Group A),    Acinetobacter baumannii, Enterobacter cloacae, E. coli,    Klebsiella oxytoca, K. pneumoniae, Proteus sp.,    Serratia marcescens, Haemophilus influenzae,    Neisseria meningitidis, Pseudomonas aeruginosa, Candida    albicans, C. glabrata, C krusei, C parapsilosis,    C. tropicalis and the KPC resistance gene.    Culture - Blood (collected 07-19-19 @ 16:50)  Source: .Blood Blood  Gram Stain (07-20-19 @ 10:48):    Growth in anaerobic bottle: Gram Negative Rods    Growth in aerobic bottle: Gram Negative Rods  Preliminary Report (07-20-19 @ 10:48):    Growth in anaerobic bottle: Gram Negative Rods    Growth in aerobic bottle: Gram Negative Rods      Urinalysis Basic - ( 19 Jul 2019 22:12 )    Color: Yellow / Appearance: Clear / SG: <=1.005 / pH: x  Gluc: x / Ketone: Negative  / Bili: Negative / Urobili: 2.0 mg/dL   Blood: x / Protein: Negative mg/dL / Nitrite: Negative   Leuk Esterase: Negative / RBC: x / WBC x   Sq Epi: x / Non Sq Epi: x / Bacteria: x      RADIOLOGY & ADDITIONAL TESTS:  < from: US Abdomen Limited (07.19.19 @ 21:33) >  IMPRESSION:      Mild dilation of the common bile duct up to 7 mm.    Otherwise, essentially unremarkable right upper quadrant ultrasound.    < end of copied text >    < from: CT Angio Chest Dissection Protocol (07.19.19 @ 18:27) >  IMPRESSION:    1.  No aortic dissection.    2.  No evidence of any acute inflammatory process within the chest,   abdomen, or pelvis.    3.  Nonspecific dilatation of the main pulmonary trunk to 3.5 cm.    < end of copied text >    < from: Xray Chest 1 View-PORTABLE IMMEDIATE (07.19.19 @ 16:50) >  Impression:      No radiographic evidence of acute cardiopulmonary disease.    < end of copied text >      MEDICATIONS:  MEDICATIONS  (STANDING):  cefoTEtan  IVPB 2 Gram(s) IV Intermittent every 12 hours  dextrose 5% + sodium chloride 0.9%. 1000 milliLiter(s) (100 mL/Hr) IV Continuous <Continuous>  heparin  Injectable 5000 Unit(s) SubCutaneous every 12 hours  lisinopril 5 milliGRAM(s) Oral daily  pantoprazole  Injectable 40 milliGRAM(s) IV Push daily    MEDICATIONS  (PRN):  acetaminophen   Tablet .. 650 milliGRAM(s) Oral every 6 hours PRN Temp greater or equal to 38C (100.4F)  guaiFENesin    Syrup 100 milliGRAM(s) Oral every 6 hours PRN Cough  morphine  - Injectable 4 milliGRAM(s) IV Push every 4 hours PRN Severe Pain (7 - 10)      HEALTH ISSUES - PROBLEM Dx:  Epigastric pain: Epigastric pain  HTN (hypertension): HTN (hypertension)  Fever, unspecified fever cause: Fever, unspecified fever cause      Case discussed in details with: Dr. Joseph Velázquez     PRESSURE ULCERS                                                 NO  URETHRAL CATHETER                                            NO  CENTRAL VENOUS CATHETER/PICC LINE                 NO  SEPSIS                                                                  NO    Assessment and Plan:  Patient is a 47 y/o male with recent HIV+ and HepC+ blood exposure who presented to the ED on 7-19-19 with a week of fever and abdominal pain that was admitted to the inpatient service for further workup and management.     #1) Fever 2/2 bacteremia, GNR, E coli  - ID consultation . antibiotics changed to rocephin . Now switched to ceftin  - Workup with 4th generation HIV1/2 immunoassay, Hepatitis panel, heterophile Ab test   - CBC, CMP, UA, urine cx   - 2x Blood culture showed gram negative rods , E coli , f/u sensitivity. repeat blood cx  - Acetaminophen 650 mg po q6h PRN temp >= 100.4F with 4g/day max     #2) Abdominal pain with diarrhea and vomiting   - GI ppx - continue pantoprazole 40mg IV qd   - Stool ova and parasites; stool culture   - Morphine 4mg IV q4h PRN for severe pain   - Replenish fluids with D5+1/2NS    #3) Dry cough   -- Guaifenesin 100mg po q6h PRN cough  CXR does not show any infiltrates.    #4) Hypocalcemia  - asymtomatic  - Ionized calcium f/u  - Monitor for symptoms   - supplement as required    #5) Essential HTN - controlled  - c/w lisinopril    #6) Anxiety  - Continue trazodone       #Progress Note Handoff  Pending : cultures  Disposition:  Home when stable   Discussion: Discussed with patient   Discharge Disposition: Can be discharged today to home if repeat blood cx are negative(awaited)

## 2019-07-23 NOTE — DISCHARGE NOTE PROVIDER - NSDCCPCAREPLAN_GEN_ALL_CORE_FT
PRINCIPAL DISCHARGE DIAGNOSIS  Diagnosis: Fever, unspecified fever cause  Assessment and Plan of Treatment: Sepsis secondary to bacteremia with GNR  stable .  As per sensitivity , in consulttaion with ID , IV antibiotic was changed to ceftin .  repeat blood cx are negativeMedically .   His HIV was found to be nregative in this ospitalization. he would also require a repat HIV testing in   4-6 weeks as outpatient.      SECONDARY DISCHARGE DIAGNOSES  Diagnosis: Epigastric pain  Assessment and Plan of Treatment: Gastroenteritis . resolving  He needs GI follow up as outpatient for evaluation amd for ossible need for colonoscopy

## 2019-07-23 NOTE — DISCHARGE NOTE PROVIDER - HOSPITAL COURSE
A 48 year old male history of  HTN presenting with 1 day of abdominal pain. Patient states he feels shortness of breath and has abdominal pain, fevers since sunday (tmax of 103), diaphoresis. Pain ranges from supra-umbilical, and RUQ. Bedside U/s revealed normal gallbladder. Patient was stuck by HIV positive needle and has been on antivirals x 5 weeks. He denies chest pain, vomiting, nausea, headache, back pain, neck pain.  He stated that on the day of his needle stick on 6-8-19, he went to [facility] and was prescribed Emtricitabine-Tenofovir and Raltegravir for 1 week for PEP. Afterwards, he was prescribed Biktarvy for 4 weeks however only took it for 2 weeks due to a delay in approval from his worker's comp for the second half of PEP. While on PEP, he experienced frequent diarrhea and vomiting and lost 20 lbs. 1 week ago, he started experiencing fevers with a Tmax of 103F, chills, and weakness. He also admitted to a dry cough for the last couple of days and a new episode of non bloody, watery diarrhea and vomiting that began yesterday. Today, patient felt afebrile however he had a fever of 101.2F last night.  He was managed for gastroenteritis with sepsis secondary to E coli bacteremia. He was started empirically on rocephin. As per sensitivity , in consultation with ID , IV antibiotic was changed to ceftin .  Repeat blood cx are negative .Medically stable for discharge. He needs GI follow up as outpatient for evaluation and for possible need for colonoscopy. His HIV was found to be negative in this hospitalization. He would also require a repeat HIV testing in 4-6 weeks as outpatient.    Attending Attestation:    Patient was seen independently. Latest vital signs,  imaging studies and labs were reviewed. Case was discussed with housestaff for assessment and plan.  Patient is medically stable for discharge to home. About 36 mins spent on discharge disposition.

## 2019-07-23 NOTE — PROGRESS NOTE ADULT - SUBJECTIVE AND OBJECTIVE BOX
JAMAL CLEMENS  48y, Male  Allergy: penicillins (Unknown)      CHIEF COMPLAINT: abdominal pain/   fever---progressive   > 24 hrs duration (22 Jul 2019 21:28)      INTERVAL EVENTS/HPI  - No acute events overnight  - T(F): , Max: 98.5 (07-22-19 @ 21:00)  - Denies any worsening symptoms  - Tolerating medication    ROS  10 system review -   Loose BMs    SOCIAL HISTORY    Substance Use (X  ) never used  (  ) IVDU (  ) Other:  Tobacco Usage:  (  X ) never smoked   (   ) former smoker   (   ) current smoker   Alcohol Usage: ( X  ) social  (   ) daily use (  ) denies  Sexual History: not relevant       FH noncontributory     VITALS:  T(F): 98.2, Max: 98.5 (07-22-19 @ 21:00)  HR: 70  BP: 148/84  RR: 16Vital Signs Last 24 Hrs  T(C): 36.8 (23 Jul 2019 05:15), Max: 36.9 (22 Jul 2019 21:00)  T(F): 98.2 (23 Jul 2019 05:15), Max: 98.5 (22 Jul 2019 21:00)  HR: 70 (23 Jul 2019 05:15) (65 - 70)  BP: 148/84 (23 Jul 2019 05:15) (97/61 - 148/84)  BP(mean): --  RR: 16 (23 Jul 2019 05:15) (14 - 16)  SpO2: --    PHYSICAL EXAM:  Gen: NAD, resting in bed  HEENT: Normocephalic, atraumatic  Neck: supple, no lymphadenopathy  CV: s1 s2 +   Lungs: clear   Abdomen: Soft, BS present. non tender +   Ext: Warm, well perfused  Neuro: non focal, awake  Skin: no rash, no erythema      TESTS & MEASUREMENTS:                        12.9   5.89  )-----------( 232      ( 23 Jul 2019 06:40 )             38.1     07-22    139  |  101  |  11  ----------------------------<  108<H>  4.1   |  29  |  0.8    Ca    7.9<L>      22 Jul 2019 06:48              Culture - Urine (collected 07-19-19 @ 22:12)  Source: .Urine Clean Catch (Midstream)  Final Report (07-21-19 @ 15:47):    No growth    Culture - Blood (collected 07-19-19 @ 16:50)  Source: .Blood Blood  Gram Stain (07-20-19 @ 10:47):    Growth in aerobic bottle: Gram Negative Rods    Growth in anaerobic bottle: Gram Negative Rods  Final Report (07-22-19 @ 10:58):    Growth in aerobic and anaerobic bottles: Escherichia coli    "Due to technical problems, Proteus sp. will Not be reported as part of    the BCID panel until further notice"    ***Blood Panel PCR results on this specimen are available    approximately 3 hours after the Gram stain result.***    Gram stain, PCR, and/or culture results may not always    correspond due to difference in methodologies.    ************************************************************    This PCR assay was performed using [employer].    The following targets are tested for: Enterococcus,    vancomycin resistant enterococci, Listeria monocytogenes,    coagulase negative staphylococci, S. aureus,    methicillin resistant S. aureus, Streptococcus agalactiae    (Group B), S. pneumoniae, S.pyogenes (Group A),    Acinetobacter baumannii, Enterobacter cloacae, E. coli,    Klebsiella oxytoca, K. pneumoniae, Proteus sp.,    Serratia marcescens, Haemophilus influenzae,    Neisseria meningitidis, Pseudomonas aeruginosa, Candida    albicans, C. glabrata, C krusei, C parapsilosis,    C. tropicalis and the KPC resistance gene.  Organism: Blood Culture PCR  Escherichia coli (07-22-19 @ 10:58)  Organism: Escherichia coli (07-22-19 @ 10:58)      -  Amikacin: S <=8      -  Ampicillin: R >16 These ampicillin results predict results for amoxicillin      -  Ampicillin/Sulbactam: I 16/8 Enterobacter, Citrobacter, and Serratia may develop resistance during prolonged therapy (3-4 days)      -  Aztreonam: S <=4      -  Cefazolin: S <=2 Enterobacter, Citrobacter, and Serratia may develop resistance during prolonged therapy (3-4 days)      -  Cefepime: S <=2      -  Cefoxitin: S <=4      -  Ceftriaxone: S <=1 Enterobacter, Citrobacter, and Serratia may develop resistance during prolonged therapy      -  Ciprofloxacin: R >2      -  Ertapenem: S <=0.5      -  Gentamicin: S <=1      -  Imipenem: S <=1      -  Levofloxacin: R >4      -  Meropenem: S <=1      -  Piperacillin/Tazobactam: S <=8      -  Tobramycin: S <=2      -  Trimethoprim/Sulfamethoxazole: R >2/38      Method Type: ALPA  Organism: Blood Culture PCR (07-22-19 @ 10:58)      -  Escherichia coli: Detec      Method Type: PCR    Culture - Blood (collected 07-19-19 @ 16:50)  Source: .Blood Blood  Gram Stain (07-20-19 @ 10:48):    Growth in anaerobic bottle: Gram Negative Rods    Growth in aerobic bottle: Gram Negative Rods  Final Report (07-22-19 @ 10:59):    Growth in aerobic and anaerobic bottles: Escherichia coli    see [id number]        Blood Gas Venous - Lactate: 1.0 mmoL/L (07-19-19 @ 17:01)      INFECTIOUS DISEASES TESTING  HIV-1/2 Combo Result: Nonreact (07-20-19 @ 16:05)  Hepatitis B Surface Antigen: Nonreact (06-08-19 @ 17:50)  Hepatitis C Virus Interpretation: Nonreact (06-08-19 @ 17:50)  HIV-1/2 Combo Result: Nonreact (06-08-19 @ 17:50)      RADIOLOGY & ADDITIONAL TESTS:  Ct - NO abdal collection       CARDIOLOGY TESTING  12 Lead ECG:   Ventricular Rate 80 BPM    Atrial Rate 80 BPM    P-R Interval 168 ms    QRS Duration 92 ms    Q-T Interval 404 ms    QTC Calculation(Bezet) 465 ms    P Axis 62 degrees    R Axis 26 degrees    T Axis 11 degrees    Diagnosis Line Normal sinus rhythm    Confirmed by HAYLIE MÁRQUEZ MD ([id number]) on 7/20/2019 7:49:08 AM (07-19-19 @ 16:44)      MEDICATIONS  cefTRIAXone   IVPB 2000  dextrose 5% + sodium chloride 0.9%. 1000  heparin  Injectable 5000  lisinopril 5  pantoprazole  Injectable 40      ANTIBIOTICS:  cefTRIAXone   IVPB 2000 milliGRAM(s) IV Intermittent every 24 hours

## 2019-07-24 VITALS
SYSTOLIC BLOOD PRESSURE: 137 MMHG | RESPIRATION RATE: 16 BRPM | TEMPERATURE: 99 F | HEART RATE: 52 BPM | DIASTOLIC BLOOD PRESSURE: 81 MMHG

## 2019-07-24 LAB
ANION GAP SERPL CALC-SCNC: 8 MMOL/L — SIGNIFICANT CHANGE UP (ref 7–14)
BUN SERPL-MCNC: 12 MG/DL — SIGNIFICANT CHANGE UP (ref 10–20)
CALCIUM SERPL-MCNC: 8.1 MG/DL — LOW (ref 8.5–10.1)
CHLORIDE SERPL-SCNC: 102 MMOL/L — SIGNIFICANT CHANGE UP (ref 98–110)
CO2 SERPL-SCNC: 30 MMOL/L — SIGNIFICANT CHANGE UP (ref 17–32)
CREAT SERPL-MCNC: 1 MG/DL — SIGNIFICANT CHANGE UP (ref 0.7–1.5)
GLUCOSE SERPL-MCNC: 98 MG/DL — SIGNIFICANT CHANGE UP (ref 70–99)
HCT VFR BLD CALC: 41.9 % — LOW (ref 42–52)
HGB BLD-MCNC: 13.9 G/DL — LOW (ref 14–18)
MCHC RBC-ENTMCNC: 31.2 PG — HIGH (ref 27–31)
MCHC RBC-ENTMCNC: 33.2 G/DL — SIGNIFICANT CHANGE UP (ref 32–37)
MCV RBC AUTO: 93.9 FL — SIGNIFICANT CHANGE UP (ref 80–94)
NRBC # BLD: 0 /100 WBCS — SIGNIFICANT CHANGE UP (ref 0–0)
PLATELET # BLD AUTO: 280 K/UL — SIGNIFICANT CHANGE UP (ref 130–400)
POTASSIUM SERPL-MCNC: 5.4 MMOL/L — HIGH (ref 3.5–5)
POTASSIUM SERPL-SCNC: 5.4 MMOL/L — HIGH (ref 3.5–5)
RBC # BLD: 4.46 M/UL — LOW (ref 4.7–6.1)
RBC # FLD: 11.6 % — SIGNIFICANT CHANGE UP (ref 11.5–14.5)
SODIUM SERPL-SCNC: 140 MMOL/L — SIGNIFICANT CHANGE UP (ref 135–146)
WBC # BLD: 7.12 K/UL — SIGNIFICANT CHANGE UP (ref 4.8–10.8)
WBC # FLD AUTO: 7.12 K/UL — SIGNIFICANT CHANGE UP (ref 4.8–10.8)

## 2019-07-24 PROCEDURE — 99239 HOSP IP/OBS DSCHRG MGMT >30: CPT

## 2019-07-24 RX ORDER — CEFUROXIME AXETIL 250 MG
1 TABLET ORAL
Qty: 18 | Refills: 0
Start: 2019-07-24 | End: 2019-08-01

## 2019-07-24 RX ADMIN — LISINOPRIL 5 MILLIGRAM(S): 2.5 TABLET ORAL at 06:13

## 2019-07-24 RX ADMIN — SODIUM CHLORIDE 100 MILLILITER(S): 9 INJECTION, SOLUTION INTRAVENOUS at 06:25

## 2019-07-24 RX ADMIN — Medication 500 MILLIGRAM(S): at 06:12

## 2019-07-24 RX ADMIN — Medication 1 TABLET(S): at 06:10

## 2019-07-24 NOTE — DISCHARGE NOTE NURSING/CASE MANAGEMENT/SOCIAL WORK - NSDCDPATPORTLINK_GEN_ALL_CORE
You can access the [facility] Portal, offered by [facility], by registering with the following website: [email or web address]

## 2019-07-24 NOTE — PROGRESS NOTE ADULT - SUBJECTIVE AND OBJECTIVE BOX
SARATH JAMAL  48y, Male  Allergy: penicillins (Unknown)      HPI:  · HPI Objective Statement: 48 year old male hx HTN presenting with 1 day of abdominal pain. Patient states he feels shortness of breath and has abdominal pain, fevers since sunday (tmax of 103), diaphoresis. Pain ranges from supra-umbilical, and RUQ. Bedside U/s revealed normal gallbladder. 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 He denies chest pain, vomiting, nausea, headache, back pain, neck pain. (19 Jul 2019 23:21)      Subjective  Patient was seen and examined at bedside. Reports improvement in  presenting complaint. No significant overnight events reported.        PAST MEDICAL & SURGICAL HISTORY:  HTN (hypertension)  H/O left knee surgery      VITALS:  T(F): 98.1 (07-20-19 @ 05:21), Max: 101.2 (07-20-19 @ 02:19)  HR: 77 (07-20-19 @ 05:21)  BP: 120/65 (07-20-19 @ 05:21) (119/66 - 131/65)  RR: 18 (07-20-19 @ 05:21)  SpO2: 97% (07-20-19 @ 07:58)    PHYSICAL:  General: WN/WD NAD  Neurology: A&Ox3, nonfocal  Eyes: PERRLA/ EOMI, Gross vision intact  ENT/Neck: Neck supple, trachea midline, No JVD, Gross hearing intact, + submandibular lymphadenopathy L>R  Respiratory: CTA B/L, No wheezing, rales, rhonchi  CV: RRR, S1S2, no murmurs, rubs or gallops  Abdominal: Soft, ND +BS,  Extremities: No edema, + peripheral pulses  Skin: No Rashes, Hematoma, Ecchymosis      TESTS & MEASUREMENTS:  Height (cm): 177.8 (07-20-19 @ 00:56)  Weight (kg): 96.4 (07-20-19 @ 00:56)  BMI (kg/m2): 30.5 (07-20-19 @ 00:56)                          13.3   5.55  )-----------( 144      ( 20 Jul 2019 06:51 )             39.0     PT/INR - ( 19 Jul 2019 16:50 )   PT: 14.60 sec;   INR: 1.27 ratio         PTT - ( 19 Jul 2019 16:50 )  PTT:31.3 sec  07-20    137  |  99  |  14  ----------------------------<  108<H>  4.0   |  28  |  1.0    Ca    7.8<L>      20 Jul 2019 06:51    TPro  6.4  /  Alb  3.8  /  TBili  1.2  /  DBili  0.4<H>  /  AST  47<H>  /  ALT  58<H>  /  AlkPhos  102  07-19    LIVER FUNCTIONS - ( 19 Jul 2019 16:50 )  Alb: 3.8 g/dL / Pro: 6.4 g/dL / ALK PHOS: 102 U/L / ALT: 58 U/L / AST: 47 U/L / GGT: x           CARDIAC MARKERS ( 19 Jul 2019 16:50 )  x     / <0.01 ng/mL / x     / x     / x          Culture - Blood (collected 07-19-19 @ 16:50)  Source: .Blood Blood  Gram Stain (07-20-19 @ 10:47):    Growth in aerobic bottle: Gram Negative Rods    Growth in anaerobic bottle: Gram Negative Rods  Preliminary Report (07-20-19 @ 10:47):    Growth in aerobic bottle: Gram Negative Rods    Growth in anaerobic bottle: Gram Negative Rods    "Due to technical problems, Proteus sp. will Not be reported as part of    the BCID panel until further notice"    ***Blood Panel PCR results on this specimenare available    approximately 3 hours after the Gram stain result.***    Gram stain, PCR, and/or culture results may not always    correspond due to difference in methodologies.    ************************************************************    This PCR assaywas performed using [employer].    The following targets are tested for: Enterococcus,    vancomycin resistant enterococci, Listeria monocytogenes,    coagulase negative staphylococci, S. aureus,    methicillin resistant S. aureus, Streptococcus agalactiae    (Group B), S. pneumoniae, S. pyogenes (Group A),    Acinetobacter baumannii, Enterobacter cloacae, E. coli,    Klebsiella oxytoca, K. pneumoniae, Proteus sp.,    Serratia marcescens, Haemophilus influenzae,    Neisseria meningitidis, Pseudomonas aeruginosa, Candida    albicans, C. glabrata, C krusei, C parapsilosis,    C. tropicalis and the KPC resistance gene.    Culture - Blood (collected 07-19-19 @ 16:50)  Source: .Blood Blood  Gram Stain (07-20-19 @ 10:48):    Growth in anaerobic bottle: Gram Negative Rods    Growth in aerobic bottle: Gram Negative Rods  Preliminary Report (07-20-19 @ 10:48):    Growth in anaerobic bottle: Gram Negative Rods    Growth in aerobic bottle: Gram Negative Rods      Urinalysis Basic - ( 19 Jul 2019 22:12 )    Color: Yellow / Appearance: Clear / SG: <=1.005 / pH: x  Gluc: x / Ketone: Negative  / Bili: Negative / Urobili: 2.0 mg/dL   Blood: x / Protein: Negative mg/dL / Nitrite: Negative   Leuk Esterase: Negative / RBC: x / WBC x   Sq Epi: x / Non Sq Epi: x / Bacteria: x      RADIOLOGY & ADDITIONAL TESTS:  < from: US Abdomen Limited (07.19.19 @ 21:33) >  IMPRESSION:      Mild dilation of the common bile duct up to 7 mm.    Otherwise, essentially unremarkable right upper quadrant ultrasound.    < end of copied text >    < from: CT Angio Chest Dissection Protocol (07.19.19 @ 18:27) >  IMPRESSION:    1.  No aortic dissection.    2.  No evidence of any acute inflammatory process within the chest,   abdomen, or pelvis.    3.  Nonspecific dilatation of the main pulmonary trunk to 3.5 cm.    < end of copied text >    < from: Xray Chest 1 View-PORTABLE IMMEDIATE (07.19.19 @ 16:50) >  Impression:      No radiographic evidence of acute cardiopulmonary disease.    < end of copied text >      MEDICATIONS:  MEDICATIONS  (STANDING):  cefoTEtan  IVPB 2 Gram(s) IV Intermittent every 12 hours  dextrose 5% + sodium chloride 0.9%. 1000 milliLiter(s) (100 mL/Hr) IV Continuous <Continuous>  heparin  Injectable 5000 Unit(s) SubCutaneous every 12 hours  lisinopril 5 milliGRAM(s) Oral daily  pantoprazole  Injectable 40 milliGRAM(s) IV Push daily    MEDICATIONS  (PRN):  acetaminophen   Tablet .. 650 milliGRAM(s) Oral every 6 hours PRN Temp greater or equal to 38C (100.4F)  guaiFENesin    Syrup 100 milliGRAM(s) Oral every 6 hours PRN Cough  morphine  - Injectable 4 milliGRAM(s) IV Push every 4 hours PRN Severe Pain (7 - 10)      HEALTH ISSUES - PROBLEM Dx:  Epigastric pain: Epigastric pain  HTN (hypertension): HTN (hypertension)  Fever, unspecified fever cause: Fever, unspecified fever cause      Case discussed in details with: Dr. Joseph Velázquez     PRESSURE ULCERS                                                 NO  URETHRAL CATHETER                                            NO  CENTRAL VENOUS CATHETER/PICC LINE                 NO  SEPSIS                                                                  NO    Assessment and Plan:  Patient is a 49 y/o male with recent HIV+ and HepC+ blood exposure who presented to the ED on 7-19-19 with a week of fever and abdominal pain that was admitted to the inpatient service for further workup and management.     #1) Fever 2/2 bacteremia, GNR, E coli  - ID consultation . antibiotics changed to rocephin . Now switched to ceftin  - Repeat HIV negtive  - 2x Blood culture showed gram negative rods , E coli . repeat blood cx are negative  - Acetaminophen 650 mg po q6h PRN temp >= 100.4F with 4g/day max     #2) Abdominal pain with diarrhea and vomiting-resolved   - GI ppx - continue pantoprazole 40mg IV qd     #3) Dry cough   -- Guaifenesin 100mg po q6h PRN cough  CXR does not show any infiltrates.    #4) Hypocalcemia  - asymtomatic  - Monitor for symptoms   - supplement as required    #5) Essential HTN - controlled  - c/w lisinopril    #6) Anxiety  - Continue trazodone       #Progress Note Handoff  Pending : cultures  Disposition:  Home when stable   Discussion: Discussed with patient   Discharge Disposition: Stable for discharge to home today

## 2019-07-27 LAB
CULTURE RESULTS: SIGNIFICANT CHANGE UP
SPECIMEN SOURCE: SIGNIFICANT CHANGE UP

## 2019-07-28 LAB
CULTURE RESULTS: SIGNIFICANT CHANGE UP
SPECIMEN SOURCE: SIGNIFICANT CHANGE UP

## 2019-07-30 DIAGNOSIS — R05 COUGH: ICD-10-CM

## 2019-07-30 DIAGNOSIS — K52.9 NONINFECTIVE GASTROENTERITIS AND COLITIS, UNSPECIFIED: ICD-10-CM

## 2019-07-30 DIAGNOSIS — Z88.0 ALLERGY STATUS TO PENICILLIN: ICD-10-CM

## 2019-07-30 DIAGNOSIS — Z20.6 CONTACT WITH AND (SUSPECTED) EXPOSURE TO HUMAN IMMUNODEFICIENCY VIRUS [HIV]: ICD-10-CM

## 2019-07-30 DIAGNOSIS — R10.13 EPIGASTRIC PAIN: ICD-10-CM

## 2019-07-30 DIAGNOSIS — Z79.899 OTHER LONG TERM (CURRENT) DRUG THERAPY: ICD-10-CM

## 2019-07-30 DIAGNOSIS — F41.9 ANXIETY DISORDER, UNSPECIFIED: ICD-10-CM

## 2019-07-30 DIAGNOSIS — E83.51 HYPOCALCEMIA: ICD-10-CM

## 2019-07-30 DIAGNOSIS — R11.10 VOMITING, UNSPECIFIED: ICD-10-CM

## 2019-07-30 DIAGNOSIS — I10 ESSENTIAL (PRIMARY) HYPERTENSION: ICD-10-CM

## 2019-07-30 DIAGNOSIS — A41.51 SEPSIS DUE TO ESCHERICHIA COLI [E. COLI]: ICD-10-CM

## 2019-08-09 ENCOUNTER — INPATIENT (INPATIENT)
Facility: HOSPITAL | Age: 49
LOS: 0 days | Discharge: HOME | End: 2019-08-10
Attending: SPECIALIST | Admitting: SPECIALIST
Payer: OTHER MISCELLANEOUS

## 2019-08-09 VITALS
HEART RATE: 88 BPM | TEMPERATURE: 98 F | DIASTOLIC BLOOD PRESSURE: 104 MMHG | OXYGEN SATURATION: 98 % | RESPIRATION RATE: 17 BRPM | SYSTOLIC BLOOD PRESSURE: 154 MMHG | WEIGHT: 210.1 LBS

## 2019-08-09 DIAGNOSIS — Z98.890 OTHER SPECIFIED POSTPROCEDURAL STATES: Chronic | ICD-10-CM

## 2019-08-09 LAB
ALBUMIN SERPL ELPH-MCNC: 4.7 G/DL — SIGNIFICANT CHANGE UP (ref 3.5–5.2)
ALP SERPL-CCNC: 92 U/L — SIGNIFICANT CHANGE UP (ref 30–115)
ALT FLD-CCNC: 38 U/L — SIGNIFICANT CHANGE UP (ref 0–41)
ANION GAP SERPL CALC-SCNC: 11 MMOL/L — SIGNIFICANT CHANGE UP (ref 7–14)
ANION GAP SERPL CALC-SCNC: 12 MMOL/L — SIGNIFICANT CHANGE UP (ref 7–14)
ANION GAP SERPL CALC-SCNC: 9 MMOL/L — SIGNIFICANT CHANGE UP (ref 7–14)
APTT BLD: 29.2 SEC — SIGNIFICANT CHANGE UP (ref 27–39.2)
APTT BLD: 32.9 SEC — SIGNIFICANT CHANGE UP (ref 27–39.2)
AST SERPL-CCNC: 27 U/L — SIGNIFICANT CHANGE UP (ref 0–41)
BASE EXCESS BLDA CALC-SCNC: 1.6 MMOL/L — SIGNIFICANT CHANGE UP (ref -2–2)
BASOPHILS # BLD AUTO: 0.05 K/UL — SIGNIFICANT CHANGE UP (ref 0–0.2)
BASOPHILS # BLD AUTO: 0.06 K/UL — SIGNIFICANT CHANGE UP (ref 0–0.2)
BASOPHILS NFR BLD AUTO: 0.7 % — SIGNIFICANT CHANGE UP (ref 0–1)
BASOPHILS NFR BLD AUTO: 1 % — SIGNIFICANT CHANGE UP (ref 0–1)
BILIRUB DIRECT SERPL-MCNC: 0.2 MG/DL — SIGNIFICANT CHANGE UP (ref 0–0.2)
BILIRUB INDIRECT FLD-MCNC: 0.5 MG/DL — SIGNIFICANT CHANGE UP (ref 0.2–1.2)
BILIRUB SERPL-MCNC: 0.7 MG/DL — SIGNIFICANT CHANGE UP (ref 0.2–1.2)
BLD GP AB SCN SERPL QL: SIGNIFICANT CHANGE UP
BUN SERPL-MCNC: 17 MG/DL — SIGNIFICANT CHANGE UP (ref 10–20)
BUN SERPL-MCNC: 18 MG/DL — SIGNIFICANT CHANGE UP (ref 10–20)
BUN SERPL-MCNC: 19 MG/DL — SIGNIFICANT CHANGE UP (ref 10–20)
CALCIUM SERPL-MCNC: 8.9 MG/DL — SIGNIFICANT CHANGE UP (ref 8.5–10.1)
CALCIUM SERPL-MCNC: 9 MG/DL — SIGNIFICANT CHANGE UP (ref 8.5–10.1)
CALCIUM SERPL-MCNC: 9.8 MG/DL — SIGNIFICANT CHANGE UP (ref 8.5–10.1)
CHLORIDE SERPL-SCNC: 100 MMOL/L — SIGNIFICANT CHANGE UP (ref 98–110)
CHLORIDE SERPL-SCNC: 101 MMOL/L — SIGNIFICANT CHANGE UP (ref 98–110)
CHLORIDE SERPL-SCNC: 98 MMOL/L — SIGNIFICANT CHANGE UP (ref 98–110)
CK MB CFR SERPL CALC: 2.5 NG/ML — SIGNIFICANT CHANGE UP (ref 0.6–6.3)
CK SERPL-CCNC: 145 U/L — SIGNIFICANT CHANGE UP (ref 0–225)
CO2 SERPL-SCNC: 25 MMOL/L — SIGNIFICANT CHANGE UP (ref 17–32)
CO2 SERPL-SCNC: 26 MMOL/L — SIGNIFICANT CHANGE UP (ref 17–32)
CO2 SERPL-SCNC: 28 MMOL/L — SIGNIFICANT CHANGE UP (ref 17–32)
CREAT SERPL-MCNC: 0.9 MG/DL — SIGNIFICANT CHANGE UP (ref 0.7–1.5)
CREAT SERPL-MCNC: 0.9 MG/DL — SIGNIFICANT CHANGE UP (ref 0.7–1.5)
CREAT SERPL-MCNC: 1 MG/DL — SIGNIFICANT CHANGE UP (ref 0.7–1.5)
EOSINOPHIL # BLD AUTO: 0.22 K/UL — SIGNIFICANT CHANGE UP (ref 0–0.7)
EOSINOPHIL # BLD AUTO: 0.28 K/UL — SIGNIFICANT CHANGE UP (ref 0–0.7)
EOSINOPHIL NFR BLD AUTO: 2.9 % — SIGNIFICANT CHANGE UP (ref 0–8)
EOSINOPHIL NFR BLD AUTO: 4.5 % — SIGNIFICANT CHANGE UP (ref 0–8)
GLUCOSE SERPL-MCNC: 112 MG/DL — HIGH (ref 70–99)
GLUCOSE SERPL-MCNC: 132 MG/DL — HIGH (ref 70–99)
GLUCOSE SERPL-MCNC: 94 MG/DL — SIGNIFICANT CHANGE UP (ref 70–99)
HCO3 BLDA-SCNC: 27 MMOL/L — SIGNIFICANT CHANGE UP (ref 23–27)
HCT VFR BLD CALC: 43.9 % — SIGNIFICANT CHANGE UP (ref 42–52)
HCT VFR BLD CALC: 44.5 % — SIGNIFICANT CHANGE UP (ref 42–52)
HCT VFR BLD CALC: 47.5 % — SIGNIFICANT CHANGE UP (ref 42–52)
HGB BLD-MCNC: 14.6 G/DL — SIGNIFICANT CHANGE UP (ref 14–18)
HGB BLD-MCNC: 15 G/DL — SIGNIFICANT CHANGE UP (ref 14–18)
HGB BLD-MCNC: 16 G/DL — SIGNIFICANT CHANGE UP (ref 14–18)
HOROWITZ INDEX BLDA+IHG-RTO: 40 — SIGNIFICANT CHANGE UP
IMM GRANULOCYTES NFR BLD AUTO: 0.4 % — HIGH (ref 0.1–0.3)
IMM GRANULOCYTES NFR BLD AUTO: 0.5 % — HIGH (ref 0.1–0.3)
INR BLD: 0.97 RATIO — SIGNIFICANT CHANGE UP (ref 0.65–1.3)
INR BLD: 1.02 RATIO — SIGNIFICANT CHANGE UP (ref 0.65–1.3)
LACTATE SERPL-SCNC: 0.9 MMOL/L — SIGNIFICANT CHANGE UP (ref 0.5–2.2)
LIDOCAIN IGE QN: 25 U/L — SIGNIFICANT CHANGE UP (ref 7–60)
LYMPHOCYTES # BLD AUTO: 2 K/UL — SIGNIFICANT CHANGE UP (ref 1.2–3.4)
LYMPHOCYTES # BLD AUTO: 2.06 K/UL — SIGNIFICANT CHANGE UP (ref 1.2–3.4)
LYMPHOCYTES # BLD AUTO: 26.5 % — SIGNIFICANT CHANGE UP (ref 20.5–51.1)
LYMPHOCYTES # BLD AUTO: 33.1 % — SIGNIFICANT CHANGE UP (ref 20.5–51.1)
MAGNESIUM SERPL-MCNC: 2 MG/DL — SIGNIFICANT CHANGE UP (ref 1.8–2.4)
MCHC RBC-ENTMCNC: 31.4 PG — HIGH (ref 27–31)
MCHC RBC-ENTMCNC: 31.4 PG — HIGH (ref 27–31)
MCHC RBC-ENTMCNC: 31.5 PG — HIGH (ref 27–31)
MCHC RBC-ENTMCNC: 33.3 G/DL — SIGNIFICANT CHANGE UP (ref 32–37)
MCHC RBC-ENTMCNC: 33.7 G/DL — SIGNIFICANT CHANGE UP (ref 32–37)
MCHC RBC-ENTMCNC: 33.7 G/DL — SIGNIFICANT CHANGE UP (ref 32–37)
MCV RBC AUTO: 93.1 FL — SIGNIFICANT CHANGE UP (ref 80–94)
MCV RBC AUTO: 93.3 FL — SIGNIFICANT CHANGE UP (ref 80–94)
MCV RBC AUTO: 94.6 FL — HIGH (ref 80–94)
MONOCYTES # BLD AUTO: 0.55 K/UL — SIGNIFICANT CHANGE UP (ref 0.1–0.6)
MONOCYTES # BLD AUTO: 0.59 K/UL — SIGNIFICANT CHANGE UP (ref 0.1–0.6)
MONOCYTES NFR BLD AUTO: 7.8 % — SIGNIFICANT CHANGE UP (ref 1.7–9.3)
MONOCYTES NFR BLD AUTO: 8.8 % — SIGNIFICANT CHANGE UP (ref 1.7–9.3)
NEUTROPHILS # BLD AUTO: 3.24 K/UL — SIGNIFICANT CHANGE UP (ref 1.4–6.5)
NEUTROPHILS # BLD AUTO: 4.65 K/UL — SIGNIFICANT CHANGE UP (ref 1.4–6.5)
NEUTROPHILS NFR BLD AUTO: 52.1 % — SIGNIFICANT CHANGE UP (ref 42.2–75.2)
NEUTROPHILS NFR BLD AUTO: 61.7 % — SIGNIFICANT CHANGE UP (ref 42.2–75.2)
NRBC # BLD: 0 /100 WBCS — SIGNIFICANT CHANGE UP (ref 0–0)
PCO2 BLDA: 43 MMHG — HIGH (ref 38–42)
PH BLDA: 7.4 — SIGNIFICANT CHANGE UP (ref 7.38–7.42)
PHOSPHATE SERPL-MCNC: 4 MG/DL — SIGNIFICANT CHANGE UP (ref 2.1–4.9)
PLATELET # BLD AUTO: 179 K/UL — SIGNIFICANT CHANGE UP (ref 130–400)
PLATELET # BLD AUTO: 186 K/UL — SIGNIFICANT CHANGE UP (ref 130–400)
PLATELET # BLD AUTO: 206 K/UL — SIGNIFICANT CHANGE UP (ref 130–400)
PO2 BLDA: 119 MMHG — HIGH (ref 78–95)
POTASSIUM SERPL-MCNC: 4.7 MMOL/L — SIGNIFICANT CHANGE UP (ref 3.5–5)
POTASSIUM SERPL-MCNC: 4.9 MMOL/L — SIGNIFICANT CHANGE UP (ref 3.5–5)
POTASSIUM SERPL-MCNC: 5.3 MMOL/L — HIGH (ref 3.5–5)
POTASSIUM SERPL-SCNC: 4.7 MMOL/L — SIGNIFICANT CHANGE UP (ref 3.5–5)
POTASSIUM SERPL-SCNC: 4.9 MMOL/L — SIGNIFICANT CHANGE UP (ref 3.5–5)
POTASSIUM SERPL-SCNC: 5.3 MMOL/L — HIGH (ref 3.5–5)
PROT SERPL-MCNC: 7.9 G/DL — SIGNIFICANT CHANGE UP (ref 6–8)
PROTHROM AB SERPL-ACNC: 11.2 SEC — SIGNIFICANT CHANGE UP (ref 9.95–12.87)
PROTHROM AB SERPL-ACNC: 11.7 SEC — SIGNIFICANT CHANGE UP (ref 9.95–12.87)
RBC # BLD: 4.64 M/UL — LOW (ref 4.7–6.1)
RBC # BLD: 4.77 M/UL — SIGNIFICANT CHANGE UP (ref 4.7–6.1)
RBC # BLD: 5.1 M/UL — SIGNIFICANT CHANGE UP (ref 4.7–6.1)
RBC # FLD: 12.2 % — SIGNIFICANT CHANGE UP (ref 11.5–14.5)
RBC # FLD: 12.3 % — SIGNIFICANT CHANGE UP (ref 11.5–14.5)
RBC # FLD: 12.4 % — SIGNIFICANT CHANGE UP (ref 11.5–14.5)
SAO2 % BLDA: 99 % — HIGH (ref 94–98)
SODIUM SERPL-SCNC: 133 MMOL/L — LOW (ref 135–146)
SODIUM SERPL-SCNC: 138 MMOL/L — SIGNIFICANT CHANGE UP (ref 135–146)
SODIUM SERPL-SCNC: 139 MMOL/L — SIGNIFICANT CHANGE UP (ref 135–146)
TROPONIN T SERPL-MCNC: <0.01 NG/ML — SIGNIFICANT CHANGE UP
WBC # BLD: 10.09 K/UL — SIGNIFICANT CHANGE UP (ref 4.8–10.8)
WBC # BLD: 6.22 K/UL — SIGNIFICANT CHANGE UP (ref 4.8–10.8)
WBC # BLD: 7.54 K/UL — SIGNIFICANT CHANGE UP (ref 4.8–10.8)
WBC # FLD AUTO: 10.09 K/UL — SIGNIFICANT CHANGE UP (ref 4.8–10.8)
WBC # FLD AUTO: 6.22 K/UL — SIGNIFICANT CHANGE UP (ref 4.8–10.8)
WBC # FLD AUTO: 7.54 K/UL — SIGNIFICANT CHANGE UP (ref 4.8–10.8)

## 2019-08-09 PROCEDURE — 71045 X-RAY EXAM CHEST 1 VIEW: CPT | Mod: 26

## 2019-08-09 PROCEDURE — 74177 CT ABD & PELVIS W/CONTRAST: CPT | Mod: 26

## 2019-08-09 PROCEDURE — 99285 EMERGENCY DEPT VISIT HI MDM: CPT

## 2019-08-09 PROCEDURE — 71045 X-RAY EXAM CHEST 1 VIEW: CPT | Mod: 26,77

## 2019-08-09 PROCEDURE — 88302 TISSUE EXAM BY PATHOLOGIST: CPT | Mod: 26

## 2019-08-09 PROCEDURE — 93010 ELECTROCARDIOGRAM REPORT: CPT

## 2019-08-09 RX ORDER — PANTOPRAZOLE SODIUM 20 MG/1
40 TABLET, DELAYED RELEASE ORAL
Refills: 0 | Status: DISCONTINUED | OUTPATIENT
Start: 2019-08-09 | End: 2019-08-09

## 2019-08-09 RX ORDER — FENTANYL CITRATE 50 UG/ML
50 INJECTION INTRAVENOUS ONCE
Refills: 0 | Status: DISCONTINUED | OUTPATIENT
Start: 2019-08-09 | End: 2019-08-09

## 2019-08-09 RX ORDER — FENTANYL CITRATE 50 UG/ML
25 INJECTION INTRAVENOUS
Refills: 0 | Status: DISCONTINUED | OUTPATIENT
Start: 2019-08-09 | End: 2019-08-10

## 2019-08-09 RX ORDER — IOHEXOL 300 MG/ML
30 INJECTION, SOLUTION INTRAVENOUS ONCE
Refills: 0 | Status: COMPLETED | OUTPATIENT
Start: 2019-08-09 | End: 2019-08-09

## 2019-08-09 RX ORDER — LISINOPRIL 2.5 MG/1
5 TABLET ORAL DAILY
Refills: 0 | Status: DISCONTINUED | OUTPATIENT
Start: 2019-08-09 | End: 2019-08-09

## 2019-08-09 RX ORDER — HEPARIN SODIUM 5000 [USP'U]/ML
5000 INJECTION INTRAVENOUS; SUBCUTANEOUS EVERY 8 HOURS
Refills: 0 | Status: DISCONTINUED | OUTPATIENT
Start: 2019-08-09 | End: 2019-08-09

## 2019-08-09 RX ORDER — HEPARIN SODIUM 5000 [USP'U]/ML
5000 INJECTION INTRAVENOUS; SUBCUTANEOUS EVERY 8 HOURS
Refills: 0 | Status: DISCONTINUED | OUTPATIENT
Start: 2019-08-09 | End: 2019-08-10

## 2019-08-09 RX ORDER — MORPHINE SULFATE 50 MG/1
4 CAPSULE, EXTENDED RELEASE ORAL
Refills: 0 | Status: DISCONTINUED | OUTPATIENT
Start: 2019-08-09 | End: 2019-08-09

## 2019-08-09 RX ORDER — ONDANSETRON 8 MG/1
4 TABLET, FILM COATED ORAL EVERY 6 HOURS
Refills: 0 | Status: DISCONTINUED | OUTPATIENT
Start: 2019-08-09 | End: 2019-08-10

## 2019-08-09 RX ORDER — SODIUM CHLORIDE 9 MG/ML
1000 INJECTION INTRAMUSCULAR; INTRAVENOUS; SUBCUTANEOUS
Refills: 0 | Status: DISCONTINUED | OUTPATIENT
Start: 2019-08-09 | End: 2019-08-09

## 2019-08-09 RX ORDER — ACETAMINOPHEN 500 MG
650 TABLET ORAL EVERY 6 HOURS
Refills: 0 | Status: DISCONTINUED | OUTPATIENT
Start: 2019-08-09 | End: 2019-08-10

## 2019-08-09 RX ORDER — ACETAMINOPHEN 500 MG
650 TABLET ORAL EVERY 6 HOURS
Refills: 0 | Status: DISCONTINUED | OUTPATIENT
Start: 2019-08-09 | End: 2019-08-09

## 2019-08-09 RX ORDER — DOCUSATE SODIUM 100 MG
100 CAPSULE ORAL THREE TIMES A DAY
Refills: 0 | Status: DISCONTINUED | OUTPATIENT
Start: 2019-08-09 | End: 2019-08-10

## 2019-08-09 RX ORDER — OXYCODONE AND ACETAMINOPHEN 5; 325 MG/1; MG/1
1 TABLET ORAL ONCE
Refills: 0 | Status: DISCONTINUED | OUTPATIENT
Start: 2019-08-09 | End: 2019-08-09

## 2019-08-09 RX ORDER — LISINOPRIL 2.5 MG/1
5 TABLET ORAL DAILY
Refills: 0 | Status: DISCONTINUED | OUTPATIENT
Start: 2019-08-09 | End: 2019-08-10

## 2019-08-09 RX ORDER — ACETAMINOPHEN 500 MG
650 TABLET ORAL ONCE
Refills: 0 | Status: DISCONTINUED | OUTPATIENT
Start: 2019-08-09 | End: 2019-08-09

## 2019-08-09 RX ORDER — ONDANSETRON 8 MG/1
4 TABLET, FILM COATED ORAL EVERY 6 HOURS
Refills: 0 | Status: DISCONTINUED | OUTPATIENT
Start: 2019-08-09 | End: 2019-08-09

## 2019-08-09 RX ORDER — TRAZODONE HCL 50 MG
50 TABLET ORAL AT BEDTIME
Refills: 0 | Status: DISCONTINUED | OUTPATIENT
Start: 2019-08-09 | End: 2019-08-09

## 2019-08-09 RX ORDER — FENTANYL CITRATE 50 UG/ML
0.5 INJECTION INTRAVENOUS
Qty: 2500 | Refills: 0 | Status: DISCONTINUED | OUTPATIENT
Start: 2019-08-09 | End: 2019-08-09

## 2019-08-09 RX ORDER — CHLORHEXIDINE GLUCONATE 213 G/1000ML
1 SOLUTION TOPICAL
Refills: 0 | Status: DISCONTINUED | OUTPATIENT
Start: 2019-08-09 | End: 2019-08-10

## 2019-08-09 RX ORDER — ONDANSETRON 8 MG/1
4 TABLET, FILM COATED ORAL EVERY 4 HOURS
Refills: 0 | Status: DISCONTINUED | OUTPATIENT
Start: 2019-08-09 | End: 2019-08-09

## 2019-08-09 RX ORDER — MORPHINE SULFATE 50 MG/1
2 CAPSULE, EXTENDED RELEASE ORAL EVERY 4 HOURS
Refills: 0 | Status: DISCONTINUED | OUTPATIENT
Start: 2019-08-09 | End: 2019-08-09

## 2019-08-09 RX ORDER — OXYCODONE HYDROCHLORIDE 5 MG/1
5 TABLET ORAL EVERY 6 HOURS
Refills: 0 | Status: DISCONTINUED | OUTPATIENT
Start: 2019-08-09 | End: 2019-08-09

## 2019-08-09 RX ORDER — IBUPROFEN 200 MG
800 TABLET ORAL ONCE
Refills: 0 | Status: COMPLETED | OUTPATIENT
Start: 2019-08-09 | End: 2019-08-09

## 2019-08-09 RX ORDER — MIDAZOLAM HYDROCHLORIDE 1 MG/ML
2 INJECTION, SOLUTION INTRAMUSCULAR; INTRAVENOUS ONCE
Refills: 0 | Status: DISCONTINUED | OUTPATIENT
Start: 2019-08-09 | End: 2019-08-09

## 2019-08-09 RX ORDER — DEXMEDETOMIDINE HYDROCHLORIDE IN 0.9% SODIUM CHLORIDE 4 UG/ML
0.1 INJECTION INTRAVENOUS
Qty: 200 | Refills: 0 | Status: DISCONTINUED | OUTPATIENT
Start: 2019-08-09 | End: 2019-08-10

## 2019-08-09 RX ORDER — MORPHINE SULFATE 50 MG/1
4 CAPSULE, EXTENDED RELEASE ORAL ONCE
Refills: 0 | Status: DISCONTINUED | OUTPATIENT
Start: 2019-08-09 | End: 2019-08-09

## 2019-08-09 RX ORDER — ALBUTEROL 90 UG/1
2 AEROSOL, METERED ORAL EVERY 6 HOURS
Refills: 0 | Status: DISCONTINUED | OUTPATIENT
Start: 2019-08-09 | End: 2019-08-10

## 2019-08-09 RX ORDER — SODIUM CHLORIDE 9 MG/ML
1000 INJECTION, SOLUTION INTRAVENOUS
Refills: 0 | Status: DISCONTINUED | OUTPATIENT
Start: 2019-08-09 | End: 2019-08-09

## 2019-08-09 RX ORDER — SODIUM CHLORIDE 9 MG/ML
250 INJECTION, SOLUTION INTRAVENOUS ONCE
Refills: 0 | Status: COMPLETED | OUTPATIENT
Start: 2019-08-09 | End: 2019-08-09

## 2019-08-09 RX ORDER — PANTOPRAZOLE SODIUM 20 MG/1
40 TABLET, DELAYED RELEASE ORAL DAILY
Refills: 0 | Status: DISCONTINUED | OUTPATIENT
Start: 2019-08-09 | End: 2019-08-10

## 2019-08-09 RX ORDER — IBUPROFEN 200 MG
400 TABLET ORAL EVERY 8 HOURS
Refills: 0 | Status: DISCONTINUED | OUTPATIENT
Start: 2019-08-09 | End: 2019-08-09

## 2019-08-09 RX ORDER — SODIUM CHLORIDE 9 MG/ML
1000 INJECTION, SOLUTION INTRAVENOUS
Refills: 0 | Status: DISCONTINUED | OUTPATIENT
Start: 2019-08-09 | End: 2019-08-10

## 2019-08-09 RX ADMIN — FENTANYL CITRATE 50 MICROGRAM(S): 50 INJECTION INTRAVENOUS at 23:09

## 2019-08-09 RX ADMIN — MIDAZOLAM HYDROCHLORIDE 2 MILLIGRAM(S): 1 INJECTION, SOLUTION INTRAMUSCULAR; INTRAVENOUS at 20:30

## 2019-08-09 RX ADMIN — FENTANYL CITRATE 25 MICROGRAM(S): 50 INJECTION INTRAVENOUS at 23:25

## 2019-08-09 RX ADMIN — FENTANYL CITRATE 50 MICROGRAM(S): 50 INJECTION INTRAVENOUS at 21:10

## 2019-08-09 RX ADMIN — MORPHINE SULFATE 4 MILLIGRAM(S): 50 CAPSULE, EXTENDED RELEASE ORAL at 20:36

## 2019-08-09 RX ADMIN — MORPHINE SULFATE 2 MILLIGRAM(S): 50 CAPSULE, EXTENDED RELEASE ORAL at 15:02

## 2019-08-09 RX ADMIN — SODIUM CHLORIDE 100 MILLILITER(S): 9 INJECTION, SOLUTION INTRAVENOUS at 19:30

## 2019-08-09 RX ADMIN — MORPHINE SULFATE 4 MILLIGRAM(S): 50 CAPSULE, EXTENDED RELEASE ORAL at 23:15

## 2019-08-09 RX ADMIN — Medication 800 MILLIGRAM(S): at 09:33

## 2019-08-09 RX ADMIN — SODIUM CHLORIDE 100 MILLILITER(S): 9 INJECTION INTRAMUSCULAR; INTRAVENOUS; SUBCUTANEOUS at 15:02

## 2019-08-09 RX ADMIN — FENTANYL CITRATE 50 MICROGRAM(S): 50 INJECTION INTRAVENOUS at 23:05

## 2019-08-09 RX ADMIN — FENTANYL CITRATE 4.65 MICROGRAM(S)/KG/HR: 50 INJECTION INTRAVENOUS at 21:10

## 2019-08-09 RX ADMIN — MORPHINE SULFATE 4 MILLIGRAM(S): 50 CAPSULE, EXTENDED RELEASE ORAL at 10:55

## 2019-08-09 RX ADMIN — HEPARIN SODIUM 5000 UNIT(S): 5000 INJECTION INTRAVENOUS; SUBCUTANEOUS at 23:07

## 2019-08-09 RX ADMIN — FENTANYL CITRATE 50 MICROGRAM(S): 50 INJECTION INTRAVENOUS at 21:30

## 2019-08-09 RX ADMIN — DEXMEDETOMIDINE HYDROCHLORIDE IN 0.9% SODIUM CHLORIDE 2.33 MICROGRAM(S)/KG/HR: 4 INJECTION INTRAVENOUS at 19:30

## 2019-08-09 RX ADMIN — FENTANYL CITRATE 25 MICROGRAM(S): 50 INJECTION INTRAVENOUS at 23:40

## 2019-08-09 RX ADMIN — IOHEXOL 30 MILLILITER(S): 300 INJECTION, SOLUTION INTRAVENOUS at 10:55

## 2019-08-09 RX ADMIN — SODIUM CHLORIDE 500 MILLILITER(S): 9 INJECTION, SOLUTION INTRAVENOUS at 21:20

## 2019-08-09 NOTE — H&P ADULT - NSHPPHYSICALEXAM_GEN_ALL_CORE
PHYSICAL EXAM:  GENERAL: NAD, well-appearing  CHEST/LUNG: Clear to auscultation bilaterally  HEART: Regular rate and rhythm  ABDOMEN: Soft, umbilical tenderness, palpable umbilical hernia, partially reducible, Nondistended;

## 2019-08-09 NOTE — ED PROVIDER NOTE - CLINICAL SUMMARY MEDICAL DECISION MAKING FREE TEXT BOX
Pt to be admitted for intractable pain to surgical service. Pt voiced understanding and agrees with plan.

## 2019-08-09 NOTE — BRIEF OPERATIVE NOTE - NSICDXBRIEFPOSTOP_GEN_ALL_CORE_FT
POST-OP DIAGNOSIS:  Umbilical hernia 09-Aug-2019 17:36:31  Radames Rubalcava POST-OP DIAGNOSIS:  Incarcerated umbilical hernia 09-Aug-2019 18:31:23  Radames Rubalcava  Incarcerated epigastric hernia 09-Aug-2019 18:31:10  Radames Rubalcava

## 2019-08-09 NOTE — H&P ADULT - NSHPLABSRESULTS_GEN_ALL_CORE
LABS:  Labs:  CAPILLARY BLOOD GLUCOSE                 16.0   7.54  )-----------( 206      ( 09 Aug 2019 10:15 )             47.5       Auto Neutrophil %: 61.7 % (08-09-19 @ 10:15)  Auto Immature Granulocyte %: 0.4 % (08-09-19 @ 10:15)    08-09    139  |  100  |  19  ----------------------------<  112<H>  5.3<H>   |  28  |  1.0      Calcium, Total Serum: 9.8 mg/dL (08-09-19 @ 10:15)      LFTs:             7.9  | 0.7  | 27       ------------------[92      ( 09 Aug 2019 10:15 )  4.7  | 0.2  | 38          Lipase:25     Amylase:x         Lactate, Blood: 0.9 mmol/L (08-09-19 @ 10:15)    RADIOLOGY & ADDITIONAL STUDIES: LABS:  Labs:  CAPILLARY BLOOD GLUCOSE                 16.0   7.54  )-----------( 206      ( 09 Aug 2019 10:15 )             47.5       Auto Neutrophil %: 61.7 % (08-09-19 @ 10:15)  Auto Immature Granulocyte %: 0.4 % (08-09-19 @ 10:15)    08-09    139  |  100  |  19  ----------------------------<  112<H>  5.3<H>   |  28  |  1.0      Calcium, Total Serum: 9.8 mg/dL (08-09-19 @ 10:15)      LFTs:             7.9  | 0.7  | 27       ------------------[92      ( 09 Aug 2019 10:15 )  4.7  | 0.2  | 38          Lipase:25     Amylase:x         Lactate, Blood: 0.9 mmol/L (08-09-19 @ 10:15)    RADIOLOGY & ADDITIONAL STUDIES:  < from: CT Abdomen and Pelvis w/ Oral Cont and w/ IV Cont (08.09.19 @ 13:06) >    HEPATOBILIARY: Again seen are numerous stable hepatic cysts and   additional subcentimeter hypodensities, which are too small to   characterize, stable.    KIDNEYS: Enhance symmetrically without hydroureteronephrosis. There are   bilateral renal cysts and too small to characterize hypodensities, stable.  PERITONEUM/MESENTERY/BOWEL: No evidence of bowel obstruction,   pneumoperitoneum or ascites. A normal appendix is seen in the right lower   quadrant. Small left fat-containing were no hernia.here is an umbilical fat-containing hernia measuring   approximately 3.3 cm with a 1.7 cm neck.    < end of copied text >

## 2019-08-09 NOTE — H&P ADULT - HISTORY OF PRESENT ILLNESS
48M PMH HTN presenting to the ED with chief complaint of back and umbilical pain. 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 He caught the patient but immediately felt back pain. He reports additional umbilical pain with new protrusion from his umbilicus. He has never seen this in the past. He denies fevers, chills. Last bowel movement this AM, no flatus since AM. Denies feeling bloated, nauseated.    Reports previous diagnosis of left inguinal hernia, elected for nonsurgical option d/t preference to avoid mesh.    PAST MEDICAL & SURGICAL HISTORY:  HTN (hypertension)  H/O left knee surgery        MEDICATIONS  (STANDING):    MEDICATIONS  (PRN):      Allergies    penicillins (Unknown)    Intolerances        REVIEW OF SYSTEMS    [x ] A ten-point review of systems was otherwise negative except as noted.  [ ] Due to altered mental status/intubation, subjective information were not able to be obtained from the patient. History was obtained, to the extent possible, from review of the chart and collateral sources of information.      Vital Signs Last 24 Hrs  T(C): 36.7 (09 Aug 2019 08:52), Max: 36.7 (09 Aug 2019 08:52)  T(F): 98 (09 Aug 2019 08:52), Max: 98 (09 Aug 2019 08:52)  HR: 88 (09 Aug 2019 08:52) (88 - 88)  BP: 154/104 (09 Aug 2019 08:52) (154/104 - 154/104)  BP(mean): --  RR: 17 (09 Aug 2019 08:52) (17 - 17)  SpO2: 98% (09 Aug 2019 08:52) (98% - 98%) 48M PMH HTN presenting to the ED with chief complaint of back and umbilical pain. 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 He caught the patient but immediately felt back pain. He reports additional umbilical pain with new protrusion from his umbilicus. He has never seen this in the past. He denies fevers, chills. Last bowel movement this AM, no flatus since AM. Denies feeling bloated, nauseated.    Reports previous diagnosis of left inguinal hernia, elected for nonsurgical option d/t preference to avoid mesh.    Of note, patient recently discharged from [facility] on 7/24 2/2 E. coli bacteremia, abdominal pain, having completed 2 months of antivirals after being stuck w/ a needle used on an HIV+/HepC+ patient. Last hepatitis, HIV panels are negative.    PAST MEDICAL & SURGICAL HISTORY:  HTN (hypertension)  H/O left knee surgery        MEDICATIONS  (STANDING):    MEDICATIONS  (PRN):      Allergies    penicillins (Unknown)    Intolerances        REVIEW OF SYSTEMS    [x ] A ten-point review of systems was otherwise negative except as noted.  [ ] Due to altered mental status/intubation, subjective information were not able to be obtained from the patient. History was obtained, to the extent possible, from review of the chart and collateral sources of information.      Vital Signs Last 24 Hrs  T(C): 36.7 (09 Aug 2019 08:52), Max: 36.7 (09 Aug 2019 08:52)  T(F): 98 (09 Aug 2019 08:52), Max: 98 (09 Aug 2019 08:52)  HR: 88 (09 Aug 2019 08:52) (88 - 88)  BP: 154/104 (09 Aug 2019 08:52) (154/104 - 154/104)  BP(mean): --  RR: 17 (09 Aug 2019 08:52) (17 - 17)  SpO2: 98% (09 Aug 2019 08:52) (98% - 98%)

## 2019-08-09 NOTE — ED PROVIDER NOTE - PROGRESS NOTE DETAILS
case discussed with surgery ATTENDING NOTE:   47 y/o M with PMH of HTN s/p left knee surgery presents to ED for evaluation of abdominal pain s/p injury today. 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 No head trauma or LOC. No CP, SOB, nausea, vomiting.     VITAL SIGNS: noted. CONSTITUTIONAL: uncomfortable appearing but in no acute distress. HEAD: Normocephalic; atraumatic. CARD: Regular rate and rhythm. RESP: CTAB/L, no wheezes, rales or rhonchi. ABD: soft, ND, (+)umbilical hernia that is tender and NOT reducible with no overlying skin changes noted, (+)LLQ TTP with guarding. No CVAT. EXT: Normal ROM. No calf tenderness or edema. Distal pulses intact. NEURO: Alert, oriented. Grossly unremarkable. No focal deficits. SKIN: Skin exam is warm and dry, no acute rash.   Plan for CT, labs, reassess. ?incarcerated hernia.

## 2019-08-09 NOTE — ED ADULT NURSE NOTE - OBJECTIVE STATEMENT
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 Pt was holding on to his arm and pulled him down as he fell. He is c/o left hip pain and lower back pain, with protruding umbilicus which pt states is new from injury.

## 2019-08-09 NOTE — ED PROVIDER NOTE - PHYSICAL EXAMINATION
CONSTITUTIONAL: Well-developed; well-nourished; in no acute distress.   SKIN: warm, dry  HEAD: Normocephalic; atraumatic.  EYES: no conjunctival injection. PERRLA. EOMI.   ENT: No nasal discharge; airway clear.  NECK: Supple; non tender.  CARD: S1, S2 normal; no murmurs, gallops, or rubs. Regular rate and rhythm.   RESP: No wheezes, rales or rhonchi.  ABD: soft nondistended. diffusely ttp more pronounced in LLQ and umbilical area. umbilical hernia present. no CVA tenderness.   EXT: Normal ROM.  No clubbing, cyanosis or edema.   BACK: no midline tenderness or step off. left sided paraspinal muscle ttp. no numbness/tingling.   LYMPH: No acute cervical adenopathy.  NEURO: AOx3, CN 2-12 grossly intact. +5/5 strength and sensation wnl in all extremities. No dysarthria, no ataxia, normal gait.   PSYCH: Cooperative, appropriate. CONSTITUTIONAL: Well-developed; well-nourished; in no acute distress.   SKIN: warm, dry  HEAD: Normocephalic; atraumatic.  EYES: no conjunctival injection. PERRLA. EOMI.   ENT: No nasal discharge; airway clear.  NECK: Supple; non tender.  CARD: S1, S2 normal; no murmurs, gallops, or rubs. Regular rate and rhythm.   RESP: No wheezes, rales or rhonchi.  ABD: soft nondistended. diffusely ttp more pronounced in LLQ and umbilical area. umbilical hernia present, not reducible. no CVA tenderness.   EXT: Normal ROM.  No clubbing, cyanosis or edema.   BACK: no midline tenderness or step off. left sided paraspinal muscle ttp. no numbness/tingling.   LYMPH: No acute cervical adenopathy.  NEURO: AOx3, CN 2-12 grossly intact. +5/5 strength and sensation wnl in all extremities. No dysarthria, no ataxia, normal gait.   PSYCH: Cooperative, appropriate.

## 2019-08-09 NOTE — ED PROVIDER NOTE - NS ED ROS FT
Review of Systems:  CONSTITUTIONAL: No fever, No diaphoresis, No weight change  SKIN: No rash  HEMATOLOGIC: No abnormal bleeding or bruising  EYES: No eye pain, No blurred vision  ENT: No change in hearing, No sore throat, No neck pain, No rhinorrhea, No ear pain  RESPIRATORY: No shortness of breath, No cough  CARDIAC: No chest pain, No palpitations  GI: +abdominal pain, No nausea, No vomiting, No diarrhea, No constipation, No bright red blood per rectum or melena. No flank pain  : No dysuria, frequency, hematuria.   MUSCULOSKELETAL: No joint paint, No swelling, +back pain  NEUROLOGIC: No numbness, No focal weakness, No headache, No dizziness  All other systems negative, unless specified in HPI

## 2019-08-09 NOTE — BRIEF OPERATIVE NOTE - OPERATION/FINDINGS
fat containing umbilical hernia incarcerated fat containing umbilical and epigastric hernia incarcerated fat containing umbilical and epigastric hernia, closed primarily

## 2019-08-09 NOTE — ED PROVIDER NOTE - ATTENDING CONTRIBUTION TO CARE
Attending Statement: I have personally seen and examined this patient.  I have fully participated in the care of this patient. I have reviewed all pertinent clinical information, including history, physical exam, plan and the ACP/Resident’s note and agree except as noted.     Attending Contribution to Care:     ATTENDING NOTE:   49 y/o M with PMH of HTN s/p left knee surgery presents to ED for evaluation of abdominal pain s/p injury today. 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 No head trauma or LOC. No CP, SOB, nausea, vomiting.     VITAL SIGNS: noted. CONSTITUTIONAL: uncomfortable appearing but in no acute distress. HEAD: Normocephalic; atraumatic. CARD: Regular rate and rhythm. RESP: CTAB/L, no wheezes, rales or rhonchi. ABD: soft, ND, (+)umbilical hernia that is tender and NOT reducible with no overlying skin changes noted, (+)LLQ TTP with guarding. No CVAT. EXT: Normal ROM. No calf tenderness or edema. Distal pulses intact. NEURO: Alert, oriented. Grossly unremarkable. No focal deficits. SKIN: Skin exam is warm and dry, no acute rash.   Plan for CT, labs, reassess. ?incarcerated hernia.

## 2019-08-09 NOTE — ED ADULT TRIAGE NOTE - CHIEF COMPLAINT QUOTE
patient fell backward at work, denies syncope, complains of back and hip pain, also noted a lump around umbilical area.

## 2019-08-09 NOTE — ED PROVIDER NOTE - OBJECTIVE STATEMENT
49 yo M PMHx HTN presents to ED with abdominal and left lower back pain. 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 Pt currently complaining of LLQ pain and pain around umbilicus that has been constant. No nausea or vomiting. No LOC. Pt also complaining of left sided hip and lower back pain worse when walking. No bowel/bladder incontinence. No fevers.

## 2019-08-09 NOTE — H&P ADULT - ASSESSMENT
48M PMH HTN presenting to the ED with chief complaint of back and umbilical pain.    Plan:    - admit to surgery (Kyle)  - plan for operative management of umbilical hernia   - NPO, IVF  - IV abx  - pain control 48M PMH HTN presenting to the ED with chief complaint of back and umbilical pain.    Plan:    - admit to surgery (Raymon)  - plan for operative management of umbilical hernia   - NPO, IVF  - pain control

## 2019-08-09 NOTE — CHART NOTE - NSCHARTNOTEFT_GEN_A_CORE
PACU ANESTHESIA ADMISSION NOTE      Procedure: Open repair of umbilical hernia in adult    Post op diagnosis:  Incarcerated umbilical hernia  Incarcerated epigastric hernia  Umbilical hernia      ____  Intubated  TV:_500_      Rate: ___14__      FiO2: 100   P5    __  Patent Airway    ___  Full return of protective reflexes    ___  Full recovery from anesthesia / back to baseline status    Vitals:  T(C): 36.7  HR: 64  BP: 143/91  RR: 17  SpO2: 99%    Mental Status:  __ Awake   _____ Alert   _____ Drowsy   __x___ Sedated    Nausea/Vomiting:  _x___  NO       ______Yes,   See Post - Op Orders         Pain Scale (0-10):  __0___    Treatment: _x___ None    ____ See Post - Op/PCA Orders    Post - Operative Fluids:   ____ Oral   ___x_ See Post - Op Orders    Plan: Discharge:   __Home       _____Floor     ___x__Critical Care      Other:_________________    Comments:  Report given to ICU attending and ICU Resident

## 2019-08-09 NOTE — CONSULT NOTE ADULT - SUBJECTIVE AND OBJECTIVE BOX
JAMAL CLEMENS [id number]  48y Male    HPI:   48M PMH HTN presenting to the ED with chief complaint of back and umbilical pain. 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 He caught the patient but immediately felt back pain. He reports additional umbilical pain with new protrusion from his umbilicus. He has never seen this in the past. He denies fevers, chills. Last bowel movement this AM, no flatus since AM. Denies feeling bloated, nauseated.    Reports previous diagnosis of left inguinal hernia, elected for nonsurgical option d/t preference to avoid mesh.    PAST MEDICAL & SURGICAL HISTORY:  HTN (hypertension)  H/O left knee surgery        MEDICATIONS  (STANDING):    MEDICATIONS  (PRN):      Allergies    penicillins (Unknown)    Intolerances        REVIEW OF SYSTEMS    [x ] A ten-point review of systems was otherwise negative except as noted.  [ ] Due to altered mental status/intubation, subjective information were not able to be obtained from the patient. History was obtained, to the extent possible, from review of the chart and collateral sources of information.      Vital Signs Last 24 Hrs  T(C): 36.7 (09 Aug 2019 08:52), Max: 36.7 (09 Aug 2019 08:52)  T(F): 98 (09 Aug 2019 08:52), Max: 98 (09 Aug 2019 08:52)  HR: 88 (09 Aug 2019 08:52) (88 - 88)  BP: 154/104 (09 Aug 2019 08:52) (154/104 - 154/104)  BP(mean): --  RR: 17 (09 Aug 2019 08:52) (17 - 17)  SpO2: 98% (09 Aug 2019 08:52) (98% - 98%)    PHYSICAL EXAM:  GENERAL: NAD, well-appearing  CHEST/LUNG: Clear to auscultation bilaterally  HEART: Regular rate and rhythm  ABDOMEN: Soft, periumbilical tenderness, palpable umbilical hernia, partially reducible, Nondistended;       LABS:  Labs:  CAPILLARY BLOOD GLUCOSE                              16.0   7.54  )-----------( 206      ( 09 Aug 2019 10:15 )             47.5       Auto Neutrophil %: 61.7 % (08-09-19 @ 10:15)  Auto Immature Granulocyte %: 0.4 % (08-09-19 @ 10:15)    08-09    139  |  100  |  19  ----------------------------<  112<H>  5.3<H>   |  28  |  1.0      Calcium, Total Serum: 9.8 mg/dL (08-09-19 @ 10:15)      LFTs:             7.9  | 0.7  | 27       ------------------[92      ( 09 Aug 2019 10:15 )  4.7  | 0.2  | 38          Lipase:25     Amylase:x         Lactate, Blood: 0.9 mmol/L (08-09-19 @ 10:15)    RADIOLOGY & ADDITIONAL STUDIES:

## 2019-08-10 VITALS
TEMPERATURE: 99 F | SYSTOLIC BLOOD PRESSURE: 115 MMHG | RESPIRATION RATE: 18 BRPM | HEART RATE: 59 BPM | DIASTOLIC BLOOD PRESSURE: 60 MMHG

## 2019-08-10 LAB
ANION GAP SERPL CALC-SCNC: 10 MMOL/L — SIGNIFICANT CHANGE UP (ref 7–14)
ANION GAP SERPL CALC-SCNC: 11 MMOL/L — SIGNIFICANT CHANGE UP (ref 7–14)
BUN SERPL-MCNC: 17 MG/DL — SIGNIFICANT CHANGE UP (ref 10–20)
BUN SERPL-MCNC: 18 MG/DL — SIGNIFICANT CHANGE UP (ref 10–20)
CALCIUM SERPL-MCNC: 8.9 MG/DL — SIGNIFICANT CHANGE UP (ref 8.5–10.1)
CALCIUM SERPL-MCNC: 9.7 MG/DL — SIGNIFICANT CHANGE UP (ref 8.5–10.1)
CHLORIDE SERPL-SCNC: 101 MMOL/L — SIGNIFICANT CHANGE UP (ref 98–110)
CHLORIDE SERPL-SCNC: 99 MMOL/L — SIGNIFICANT CHANGE UP (ref 98–110)
CK MB CFR SERPL CALC: 3.1 NG/ML — SIGNIFICANT CHANGE UP (ref 0.6–6.3)
CK MB CFR SERPL CALC: 3.2 NG/ML — SIGNIFICANT CHANGE UP (ref 0.6–6.3)
CK SERPL-CCNC: 154 U/L — SIGNIFICANT CHANGE UP (ref 0–225)
CK SERPL-CCNC: 159 U/L — SIGNIFICANT CHANGE UP (ref 0–225)
CO2 SERPL-SCNC: 23 MMOL/L — SIGNIFICANT CHANGE UP (ref 17–32)
CO2 SERPL-SCNC: 31 MMOL/L — SIGNIFICANT CHANGE UP (ref 17–32)
CREAT SERPL-MCNC: 0.9 MG/DL — SIGNIFICANT CHANGE UP (ref 0.7–1.5)
CREAT SERPL-MCNC: 1.1 MG/DL — SIGNIFICANT CHANGE UP (ref 0.7–1.5)
GLUCOSE SERPL-MCNC: 174 MG/DL — HIGH (ref 70–99)
GLUCOSE SERPL-MCNC: 87 MG/DL — SIGNIFICANT CHANGE UP (ref 70–99)
HCT VFR BLD CALC: 43 % — SIGNIFICANT CHANGE UP (ref 42–52)
HGB BLD-MCNC: 14.4 G/DL — SIGNIFICANT CHANGE UP (ref 14–18)
MAGNESIUM SERPL-MCNC: 1.9 MG/DL — SIGNIFICANT CHANGE UP (ref 1.8–2.4)
MCHC RBC-ENTMCNC: 30.8 PG — SIGNIFICANT CHANGE UP (ref 27–31)
MCHC RBC-ENTMCNC: 33.5 G/DL — SIGNIFICANT CHANGE UP (ref 32–37)
MCV RBC AUTO: 91.9 FL — SIGNIFICANT CHANGE UP (ref 80–94)
NRBC # BLD: 0 /100 WBCS — SIGNIFICANT CHANGE UP (ref 0–0)
PHOSPHATE SERPL-MCNC: 3.6 MG/DL — SIGNIFICANT CHANGE UP (ref 2.1–4.9)
PLATELET # BLD AUTO: 171 K/UL — SIGNIFICANT CHANGE UP (ref 130–400)
POTASSIUM SERPL-MCNC: 5.2 MMOL/L — HIGH (ref 3.5–5)
POTASSIUM SERPL-MCNC: 5.5 MMOL/L — HIGH (ref 3.5–5)
POTASSIUM SERPL-SCNC: 5.2 MMOL/L — HIGH (ref 3.5–5)
POTASSIUM SERPL-SCNC: 5.5 MMOL/L — HIGH (ref 3.5–5)
RBC # BLD: 4.68 M/UL — LOW (ref 4.7–6.1)
RBC # FLD: 12.1 % — SIGNIFICANT CHANGE UP (ref 11.5–14.5)
SODIUM SERPL-SCNC: 135 MMOL/L — SIGNIFICANT CHANGE UP (ref 135–146)
SODIUM SERPL-SCNC: 140 MMOL/L — SIGNIFICANT CHANGE UP (ref 135–146)
TROPONIN T SERPL-MCNC: <0.01 NG/ML — SIGNIFICANT CHANGE UP
TROPONIN T SERPL-MCNC: <0.01 NG/ML — SIGNIFICANT CHANGE UP
WBC # BLD: 7.37 K/UL — SIGNIFICANT CHANGE UP (ref 4.8–10.8)
WBC # FLD AUTO: 7.37 K/UL — SIGNIFICANT CHANGE UP (ref 4.8–10.8)

## 2019-08-10 PROCEDURE — 93010 ELECTROCARDIOGRAM REPORT: CPT

## 2019-08-10 RX ORDER — DOCUSATE SODIUM 100 MG
100 CAPSULE ORAL THREE TIMES A DAY
Refills: 0 | Status: DISCONTINUED | OUTPATIENT
Start: 2019-08-10 | End: 2019-08-10

## 2019-08-10 RX ORDER — OXYCODONE HYDROCHLORIDE 5 MG/1
5 TABLET ORAL EVERY 6 HOURS
Refills: 0 | Status: DISCONTINUED | OUTPATIENT
Start: 2019-08-10 | End: 2019-08-10

## 2019-08-10 RX ORDER — CHLORHEXIDINE GLUCONATE 213 G/1000ML
1 SOLUTION TOPICAL
Refills: 0 | Status: DISCONTINUED | OUTPATIENT
Start: 2019-08-10 | End: 2019-08-10

## 2019-08-10 RX ORDER — PANTOPRAZOLE SODIUM 20 MG/1
40 TABLET, DELAYED RELEASE ORAL
Refills: 0 | Status: DISCONTINUED | OUTPATIENT
Start: 2019-08-10 | End: 2019-08-10

## 2019-08-10 RX ORDER — OXYCODONE HYDROCHLORIDE 5 MG/1
1 TABLET ORAL
Qty: 8 | Refills: 0
Start: 2019-08-10

## 2019-08-10 RX ADMIN — FENTANYL CITRATE 25 MICROGRAM(S): 50 INJECTION INTRAVENOUS at 03:29

## 2019-08-10 RX ADMIN — SODIUM CHLORIDE 100 MILLILITER(S): 9 INJECTION, SOLUTION INTRAVENOUS at 06:27

## 2019-08-10 RX ADMIN — OXYCODONE HYDROCHLORIDE 5 MILLIGRAM(S): 5 TABLET ORAL at 11:48

## 2019-08-10 RX ADMIN — FENTANYL CITRATE 25 MICROGRAM(S): 50 INJECTION INTRAVENOUS at 07:39

## 2019-08-10 RX ADMIN — Medication 650 MILLIGRAM(S): at 11:31

## 2019-08-10 RX ADMIN — DEXMEDETOMIDINE HYDROCHLORIDE IN 0.9% SODIUM CHLORIDE 2.33 MICROGRAM(S)/KG/HR: 4 INJECTION INTRAVENOUS at 06:28

## 2019-08-10 RX ADMIN — HEPARIN SODIUM 5000 UNIT(S): 5000 INJECTION INTRAVENOUS; SUBCUTANEOUS at 06:27

## 2019-08-10 RX ADMIN — LISINOPRIL 5 MILLIGRAM(S): 2.5 TABLET ORAL at 11:32

## 2019-08-10 RX ADMIN — CHLORHEXIDINE GLUCONATE 1 APPLICATION(S): 213 SOLUTION TOPICAL at 06:27

## 2019-08-10 RX ADMIN — OXYCODONE HYDROCHLORIDE 5 MILLIGRAM(S): 5 TABLET ORAL at 17:01

## 2019-08-10 RX ADMIN — Medication 100 MILLIGRAM(S): at 11:31

## 2019-08-10 RX ADMIN — FENTANYL CITRATE 25 MICROGRAM(S): 50 INJECTION INTRAVENOUS at 08:05

## 2019-08-10 RX ADMIN — Medication 650 MILLIGRAM(S): at 17:01

## 2019-08-10 RX ADMIN — FENTANYL CITRATE 25 MICROGRAM(S): 50 INJECTION INTRAVENOUS at 03:44

## 2019-08-10 RX ADMIN — PANTOPRAZOLE SODIUM 40 MILLIGRAM(S): 20 TABLET, DELAYED RELEASE ORAL at 11:31

## 2019-08-10 NOTE — CONSULT NOTE ADULT - ASSESSMENT
48M PMH HTN presenting to the ED with chief complaint of back and umbilical pain.    Plan:    -partially reducible umbilical hernia  - f/u CT AP  - will discuss with attending
ASSESSMENT/PLAN: 48yMale with PMHx of HTN, s/p open repair of incarcerated umbilical hernia     Neurologic: keep sedated overnight, Precedex gtt with fentanyl prn pushes.  If unable to control agitation, plan to switch to propofol    Respiratory: minimal vent settings, attempt CPAP trial in am    Cardiovascular: keep normotensive, resume home lisinopril for HTN.  1st set of CE negative, 2 more sets pending    Gastrointestinal/Nutrition: keep NPO, PPI, IVF: LR @ 100cc/hr    Genitourinary/Renal: keep roland for strict I and Os, replete electrolytes prn    Hematologic: h/h stable, heparin sq for DVT prophylaxis    Infectious Disease: no ID issues    Endocrine: FS q6h while NPO    Disposition: SICU

## 2019-08-10 NOTE — CHART NOTE - NSCHARTNOTEFT_GEN_A_CORE
Salinas Greer    47 y/o male, POD #1 s/p open repair of incarcerated hernia. Pt was kept intubated post op due difficult intubation    Neurologic: intact, pain control w/ tylenol/oxy    Respiratory: difficult intubation, passed SBT this am, extubated, on RA sating 97%    Cardiovascular: keep normotensive, resume home lisinopril for HTN.  CE neg x2, f/u 11 am     Gastrointestinal/Nutrition: advanced to diet, PPI,     Genitourinary/Renal: d/c roland, tov by 7pm, IVL, replete electrolytes prn    Hematologic: h/h stable, heparin sq for DVT prophylaxis    Infectious Disease: no ID issues    Endocrine: no dx    f/u  -CE @ 11am  -TOV by 7pm  -full set of evening labs     full sign out given to primary team GAMA Rawls @1:25pm

## 2019-08-10 NOTE — CHART NOTE - NSCHARTNOTEFT_GEN_A_CORE
pt seen without acute complaints, tolerated po diet , voided and ambulated. vital signs stable and afebrile. labs reviewed. As per Dr Ennis, pt can be discharged home today. pt advised to follow up with Dr Ennis in 1 week for wound check and resume home medications. prescription for oxycodone PRN sent to his pharmacy. Precaution provided.  risks and benefits explained and all questions answered @ this time.  pt showed understanding

## 2019-08-10 NOTE — CONSULT NOTE ADULT - SUBJECTIVE AND OBJECTIVE BOX
SICU Consultation Note  =====================================================  HPI:  49 y/o M with PMHx of HTN who presented to ED with c/o back and umbilical pain.  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 He caught the patient but immediately felt back pain, as well as umbilical pain with new protrusion from his umbilicus.  CT A/P revealed an umbilical fat-containing hernia measuring approximately 3.3 cm with a 1.7 cm neck.  Pt went to the OR for repair of incarcerated umbilical hernia.  Pt had a very difficult airway requiring video laryngoscopy.  Post-op pt was unable to be extubated, remained intubated.  ICU called for close hemodynamic monitirng and attempt SBT trial in am.    Of note, patient recently discharged from [facility] on 7/24 2/2 E. coli bacteremia, abdominal pain, having completed 2 months of antivirals after being stuck w/ a needle used on an HIV+/HepC+ patient. Last hepatitis, HIV panels are negative.    Surgery Information  OR time: 2.5hrs     EBL:   2cc    UO:   no roland          IV Fluids:  1L     Blood Products: none    PAST MEDICAL & SURGICAL HISTORY:  HTN (hypertension)  H/O left knee surgery      Allergies  penicillins (Unknown)    Home Medications:  acetaminophen 325 mg oral tablet: 2 tab(s) orally every 6 hours, As needed, Temp greater or equal to 38C (100.4F) (23 Jul 2019 09:58)  lisinopril 5 mg oral tablet: 1 tab(s) orally once a day (19 Jul 2019 16:11)    Advanced Directives: Full Code     ROS:  [ ] A ten-point review of systems was otherwise negative except as noted.  [X ] Due to altered mental status/intubation, subjective information were not able to be obtained from the patient. History was obtained, to the extent possible, from review of the chart and collateral sources of information.      CURRENT MEDICATIONS:   --------------------------------------------------------------------------------------  Neurologic Medications  acetaminophen   Tablet .. 650 milliGRAM(s) Oral every 6 hours  dexmedetomidine Infusion 0.1 MICROgram(s)/kG/Hr IV Continuous <Continuous>  fentaNYL    Injectable 25 MICROGram(s) IV Push every 3 hours PRN Moderate Pain (4 - 6)  ondansetron Injectable 4 milliGRAM(s) IV Push every 6 hours PRN Nausea    Respiratory Medications  ALBUTerol    90 MICROgram(s) HFA Inhaler 2 Puff(s) Inhalation every 6 hours    Cardiovascular Medications  lisinopril 5 milliGRAM(s) Oral daily    Gastrointestinal Medications  docusate sodium Liquid 100 milliGRAM(s) Oral three times a day  lactated ringers. 1000 milliLiter(s) IV Continuous <Continuous>  pantoprazole  Injectable 40 milliGRAM(s) IV Push daily    Genitourinary Medications    Hematologic/Oncologic Medications  heparin  Injectable 5000 Unit(s) SubCutaneous every 8 hours    Antimicrobial/Immunologic Medications    Endocrine/Metabolic Medications    Topical/Other Medications  chlorhexidine 2% Cloths 1 Application(s) Topical <User Schedule>    --------------------------------------------------------------------------------------    Vital Signs Last 24 Hrs  T(C): 37.4 (09 Aug 2019 23:21), Max: 37.4 (09 Aug 2019 23:21)  T(F): 99.3 (09 Aug 2019 23:21), Max: 99.3 (09 Aug 2019 23:21)  HR: 52 (10 Aug 2019 01:00) (52 - 96)  BP: 140/88 (10 Aug 2019 01:00) (89/58 - 217/138)  BP(mean): 105 (10 Aug 2019 01:00) (80 - 111)  RR: 18 (10 Aug 2019 01:00) (14 - 33)  SpO2: 100% (10 Aug 2019 01:00) (86% - 100%)  I&O's Detail    09 Aug 2019 07:01  -  10 Aug 2019 01:26  --------------------------------------------------------  IN:    dexmedetomidine Infusion: 206.7 mL    fentaNYL  Infusion: 56 mL    Lactated Ringers IV Bolus: 250 mL    lactated ringers.: 500 mL  Total IN: 1012.7 mL    OUT:    Indwelling Catheter - Urethral: 225 mL  Total OUT: 225 mL    Total NET: 787.7 mL        I&O's Summary    09 Aug 2019 07:01  -  10 Aug 2019 01:26  --------------------------------------------------------  IN: 1012.7 mL / OUT: 225 mL / NET: 787.7 mL        LABS:  ABG - ( 09 Aug 2019 22:55 )  pH, Arterial: 7.40  pH, Blood: x     /  pCO2: 43    /  pO2: 119   / HCO3: 27    / Base Excess: 1.6   /  SaO2: 99                           14.6   10.09 )-----------( 179      ( 09 Aug 2019 21:30 )             43.9     08-09    138  |  101  |  18  ----------------------------<  132<H>  4.9   |  25  |  0.9    Ca    9.0      09 Aug 2019 21:30  Phos  4.0     08-09  Mg     2.0     08-09    TPro  7.9  /  Alb  4.7  /  TBili  0.7  /  DBili  0.2  /  AST  27  /  ALT  38  /  AlkPhos  92  08-09    LIVER FUNCTIONS - ( 09 Aug 2019 10:15 )  Alb: 4.7 g/dL / Pro: 7.9 g/dL / ALK PHOS: 92 U/L / ALT: 38 U/L / AST: 27 U/L / GGT: x           PT/INR - ( 09 Aug 2019 21:30 )   PT: 11.20 sec;   INR: 0.97 ratio    PTT - ( 09 Aug 2019 21:30 )  PTT:29.2 sec    CARDIAC MARKERS ( 09 Aug 2019 21:30 )  x     / <0.01 ng/mL / 145 U/L / x     / 2.5 ng/mL        EXAM:  General/Neuro  GCS:   Exam: Normal, NAD, alert, oriented x 3, no focal deficits. PERRLA  ***    Respiratory  Exam: Lungs clear to auscultation, Normal expansion/effort.  ***  [] Tracheostomy   [] Intubated  Mechanical Ventilation: Mode: AC/ CMV (Assist Control/ Continuous Mandatory Ventilation), RR (machine): 18, TV (machine): 500, FiO2: 40, PEEP: 5, ITime: 1, MAP: 10, PIP: 32    Cardiovascular  Exam: S1, S2.  Regular rate and rhythm.   Cardiac Rhythm: Normal Sinus Rhythm  ECHO:     GI  Exam: Abdomen soft, Non-tender, Non-distended.  Gastrostomy / Jejunostomy tube in place.  Nasogastric tube in place.  Colostomy / Ileostomy.  ***  Wound:   ***  Current Diet:  NPO***    Extremities  Exam: Extremities warm, pink, well-perfused.   Peripheral edema    Derm:  Exam: Good skin turgor, no skin breakdown.      :   Exam: Roland catheter in place.     Tubes/Lines/Drains  ***  [x] Peripheral IV  [] Central Venous Line     	[] R	[] L	[] IJ	[] Fem	[] SC        Type:	    Date Placed:   [] Arterial Line		[] R	[] L	[] Fem	[] Rad	[] Ax	Date Placed:   [] PICC:         	[] Midline		[] Mediport           [] Urinary Catheter		Date Placed: SICU Consultation Note  =====================================================  HPI:  47 y/o M with PMHx of HTN who presented to ED with c/o back and umbilical pain.  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 He caught the patient but immediately felt back pain, as well as umbilical pain with new protrusion from his umbilicus.  CT A/P revealed an umbilical fat-containing hernia measuring approximately 3.3 cm with a 1.7 cm neck.  Pt went to the OR for repair of incarcerated umbilical hernia.  Pt had a very difficult airway requiring video laryngoscopy.  Post-op pt was unable to be extubated, remained intubated.  ICU called for close hemodynamic monitoring and attempt SBT trial in am.    Of note, patient recently discharged from [facility] on 7/24 2/2 E. coli bacteremia, abdominal pain, having completed 2 months of antivirals after being stuck w/ a needle used on an HIV+/HepC+ patient. Last hepatitis, HIV panels are negative.    Surgery Information  OR time: 2.5hrs     EBL:   2cc    UO:   no roland          IV Fluids:  1L     Blood Products: none    PAST MEDICAL & SURGICAL HISTORY:  HTN (hypertension)  H/O left knee surgery    Allergies  penicillins (Unknown)    Home Medications:  acetaminophen 325 mg oral tablet: 2 tab(s) orally every 6 hours, As needed, Temp greater or equal to 38C (100.4F) (23 Jul 2019 09:58)  lisinopril 5 mg oral tablet: 1 tab(s) orally once a day (19 Jul 2019 16:11)    Advanced Directives: Full Code     ROS:  [ ] A ten-point review of systems was otherwise negative except as noted.  [X ] Due to altered mental status/intubation, subjective information were not able to be obtained from the patient. History was obtained, to the extent possible, from review of the chart and collateral sources of information.      CURRENT MEDICATIONS:   --------------------------------------------------------------------------------------  Neurologic Medications  acetaminophen   Tablet .. 650 milliGRAM(s) Oral every 6 hours  dexmedetomidine Infusion 0.1 MICROgram(s)/kG/Hr IV Continuous <Continuous>  fentaNYL    Injectable 25 MICROGram(s) IV Push every 3 hours PRN Moderate Pain (4 - 6)  ondansetron Injectable 4 milliGRAM(s) IV Push every 6 hours PRN Nausea    Respiratory Medications  ALBUTerol    90 MICROgram(s) HFA Inhaler 2 Puff(s) Inhalation every 6 hours    Cardiovascular Medications  lisinopril 5 milliGRAM(s) Oral daily    Gastrointestinal Medications  docusate sodium Liquid 100 milliGRAM(s) Oral three times a day  lactated ringers. 1000 milliLiter(s) IV Continuous <Continuous>  pantoprazole  Injectable 40 milliGRAM(s) IV Push daily    Genitourinary Medications    Hematologic/Oncologic Medications  heparin  Injectable 5000 Unit(s) SubCutaneous every 8 hours    Antimicrobial/Immunologic Medications    Endocrine/Metabolic Medications    Topical/Other Medications  chlorhexidine 2% Cloths 1 Application(s) Topical <User Schedule>    --------------------------------------------------------------------------------------    Vital Signs Last 24 Hrs  T(C): 37.4 (09 Aug 2019 23:21), Max: 37.4 (09 Aug 2019 23:21)  T(F): 99.3 (09 Aug 2019 23:21), Max: 99.3 (09 Aug 2019 23:21)  HR: 52 (10 Aug 2019 01:00) (52 - 96)  BP: 140/88 (10 Aug 2019 01:00) (89/58 - 217/138)  BP(mean): 105 (10 Aug 2019 01:00) (80 - 111)  RR: 18 (10 Aug 2019 01:00) (14 - 33)  SpO2: 100% (10 Aug 2019 01:00) (86% - 100%)  I&O's Detail    09 Aug 2019 07:01  -  10 Aug 2019 01:26  --------------------------------------------------------  IN:    dexmedetomidine Infusion: 206.7 mL    fentaNYL  Infusion: 56 mL    Lactated Ringers IV Bolus: 250 mL    lactated ringers.: 500 mL  Total IN: 1012.7 mL    OUT:    Indwelling Catheter - Urethral: 225 mL  Total OUT: 225 mL    Total NET: 787.7 mL        I&O's Summary    09 Aug 2019 07:01  -  10 Aug 2019 01:26  --------------------------------------------------------  IN: 1012.7 mL / OUT: 225 mL / NET: 787.7 mL        LABS:  ABG - ( 09 Aug 2019 22:55 )  pH, Arterial: 7.40  pH, Blood: x     /  pCO2: 43    /  pO2: 119   / HCO3: 27    / Base Excess: 1.6   /  SaO2: 99                           14.6   10.09 )-----------( 179      ( 09 Aug 2019 21:30 )             43.9     08-09    138  |  101  |  18  ----------------------------<  132<H>  4.9   |  25  |  0.9    Ca    9.0      09 Aug 2019 21:30  Phos  4.0     08-09  Mg     2.0     08-09    TPro  7.9  /  Alb  4.7  /  TBili  0.7  /  DBili  0.2  /  AST  27  /  ALT  38  /  AlkPhos  92  08-09    LIVER FUNCTIONS - ( 09 Aug 2019 10:15 )  Alb: 4.7 g/dL / Pro: 7.9 g/dL / ALK PHOS: 92 U/L / ALT: 38 U/L / AST: 27 U/L / GGT: x           PT/INR - ( 09 Aug 2019 21:30 )   PT: 11.20 sec;   INR: 0.97 ratio    PTT - ( 09 Aug 2019 21:30 )  PTT:29.2 sec    CARDIAC MARKERS ( 09 Aug 2019 21:30 )  x     / <0.01 ng/mL / 145 U/L / x     / 2.5 ng/mL        EXAM:  General/Neuro  GCS: 10T  Exam: pt is very anxious and agitated, banging on a rail, reaching for the ET tube, PERRL, EOMI, WATSON    Respiratory  Exam: some rhonchi b/l, Normal expansion/effort.   [] Tracheostomy   [X] Intubated  Mechanical Ventilation: Mode: AC/ CMV (Assist Control/ Continuous Mandatory Ventilation), RR (machine): 18, TV (machine): 500, FiO2: 40, PEEP: 5, ITime: 1, MAP: 10, PIP: 32    Cardiovascular  Exam: S1, S2.  Regular rate and rhythm.   Cardiac Rhythm: Normal Sinus Rhythm    GI  Exam: Abdomen soft, Non-tender, Non-distended.    Wound: umbilical dressing c/d/i    Extremities  Exam: Extremities warm, well-perfused.  No Peripheral edema b/l LE    Derm:  Exam: Good skin turgor, no skin breakdown.      :   Exam: Roland catheter in place.     Tubes/Lines/Drains  ***  [x] Peripheral IV  [X] Urinary Catheter		Date Placed: 8/9/19

## 2019-08-10 NOTE — DISCHARGE NOTE PROVIDER - HOSPITAL COURSE
47 y/o M with PMHx of HTN who presented to ED with c/o back and umbilical pain.  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 He caught the patient but immediately felt back pain, as well as umbilical pain with new protrusion from his umbilicus.      CT A/P revealed an umbilical fat-containing hernia measuring approximately 3.3 cm with a 1.7 cm neck.      Pt underwent  repair of incarcerated umbilical and epigastric hernia on 8/9/19   Pt had a very difficult airway requiring video laryngoscopy.  Post-op pt was unable to be extubated, remained intubated and later admitted to ICU for SBT in am    on 8/10/19 pt downgraded to floor. pt extubated, passed trial of void, tolerated po diet and ambulated. vital signs stable and afebrile. Per Dr Ennis pt discharged home in stable condition. pt advised to follow up with Dr Ennis in 1 week for wound check . Resume home medications . Precaution provided

## 2019-08-10 NOTE — DISCHARGE NOTE PROVIDER - NSDCCPCAREPLAN_GEN_ALL_CORE_FT
PRINCIPAL DISCHARGE DIAGNOSIS  Diagnosis: Hernia  Assessment and Plan of Treatment: incarcerated umbilical and epigastric hernia

## 2019-08-10 NOTE — DISCHARGE NOTE PROVIDER - NSDCFUADDINST_GEN_ALL_CORE_FT
follow up with Dr Ennis in 1 week for wound check call office for appointment  follow up with PMD  keep wound clean and dry  no strenuous activity  no tub bath  if experience fever shortness of breath, chest pain, dizziness, vomiting , bleeding or drainage from wound call MD or return to ED

## 2019-08-10 NOTE — DISCHARGE NOTE PROVIDER - CARE PROVIDER_API CALL
Timur Ennis)  Surgery; Surgical Critical Care  [address] [place] [address]  Phone: [phone]  Fax: [phone]  Follow Up Time:

## 2019-08-10 NOTE — CONSULT NOTE ADULT - ATTENDING COMMENTS
kept intubated overnight due to anesthesiologist concerns for difficult airway  patient extubated this am- tolerating RA  restart home bp meds  downgrade

## 2019-08-10 NOTE — DISCHARGE NOTE NURSING/CASE MANAGEMENT/SOCIAL WORK - NSDCDPATPORTLINK_GEN_ALL_CORE
You can access the [facility] Patient Portal, offered by [facility], by registering with the following website: [email or web address]

## 2019-08-15 DIAGNOSIS — I10 ESSENTIAL (PRIMARY) HYPERTENSION: ICD-10-CM

## 2019-08-15 DIAGNOSIS — K42.0 UMBILICAL HERNIA WITH OBSTRUCTION, WITHOUT GANGRENE: ICD-10-CM

## 2019-08-15 DIAGNOSIS — F17.200 NICOTINE DEPENDENCE, UNSPECIFIED, UNCOMPLICATED: ICD-10-CM

## 2019-08-15 DIAGNOSIS — Z88.0 ALLERGY STATUS TO PENICILLIN: ICD-10-CM

## 2019-08-15 DIAGNOSIS — K43.6 OTHER AND UNSPECIFIED VENTRAL HERNIA WITH OBSTRUCTION, WITHOUT GANGRENE: ICD-10-CM

## 2019-08-15 DIAGNOSIS — K42.9 UMBILICAL HERNIA WITHOUT OBSTRUCTION OR GANGRENE: ICD-10-CM

## 2019-08-15 LAB — SURGICAL PATHOLOGY STUDY: SIGNIFICANT CHANGE UP

## 2019-10-28 ENCOUNTER — EMERGENCY (EMERGENCY)
Facility: HOSPITAL | Age: 49
LOS: 0 days | Discharge: HOME | End: 2019-10-28
Attending: EMERGENCY MEDICINE | Admitting: EMERGENCY MEDICINE
Payer: COMMERCIAL

## 2019-10-28 VITALS
HEART RATE: 74 BPM | DIASTOLIC BLOOD PRESSURE: 76 MMHG | OXYGEN SATURATION: 100 % | SYSTOLIC BLOOD PRESSURE: 155 MMHG | RESPIRATION RATE: 20 BRPM

## 2019-10-28 VITALS
DIASTOLIC BLOOD PRESSURE: 55 MMHG | OXYGEN SATURATION: 99 % | SYSTOLIC BLOOD PRESSURE: 133 MMHG | RESPIRATION RATE: 20 BRPM | HEART RATE: 77 BPM

## 2019-10-28 DIAGNOSIS — Z88.0 ALLERGY STATUS TO PENICILLIN: ICD-10-CM

## 2019-10-28 DIAGNOSIS — Z98.890 OTHER SPECIFIED POSTPROCEDURAL STATES: Chronic | ICD-10-CM

## 2019-10-28 DIAGNOSIS — I10 ESSENTIAL (PRIMARY) HYPERTENSION: ICD-10-CM
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
## 2019-10-28 DIAGNOSIS — T78.40XA ALLERGY, UNSPECIFIED, INITIAL ENCOUNTER: ICD-10-CM
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
## 2019-10-28 DIAGNOSIS — L50.0 ALLERGIC URTICARIA: ICD-10-CM

## 2019-10-28 LAB
ALBUMIN SERPL ELPH-MCNC: 4.6 G/DL — SIGNIFICANT CHANGE UP (ref 3.5–5.2)
ALP SERPL-CCNC: 91 U/L — SIGNIFICANT CHANGE UP (ref 30–115)
ALT FLD-CCNC: 23 U/L — SIGNIFICANT CHANGE UP (ref 0–41)
ANION GAP SERPL CALC-SCNC: 12 MMOL/L — SIGNIFICANT CHANGE UP (ref 7–14)
AST SERPL-CCNC: 22 U/L — SIGNIFICANT CHANGE UP (ref 0–41)
BASOPHILS # BLD AUTO: 0.04 K/UL — SIGNIFICANT CHANGE UP (ref 0–0.2)
BASOPHILS NFR BLD AUTO: 0.5 % — SIGNIFICANT CHANGE UP (ref 0–1)
BILIRUB SERPL-MCNC: 0.5 MG/DL — SIGNIFICANT CHANGE UP (ref 0.2–1.2)
BUN SERPL-MCNC: 15 MG/DL — SIGNIFICANT CHANGE UP (ref 10–20)
CALCIUM SERPL-MCNC: 9.4 MG/DL — SIGNIFICANT CHANGE UP (ref 8.5–10.1)
CHLORIDE SERPL-SCNC: 102 MMOL/L — SIGNIFICANT CHANGE UP (ref 98–110)
CO2 SERPL-SCNC: 27 MMOL/L — SIGNIFICANT CHANGE UP (ref 17–32)
CREAT SERPL-MCNC: 1 MG/DL — SIGNIFICANT CHANGE UP (ref 0.7–1.5)
EOSINOPHIL # BLD AUTO: 0.28 K/UL — SIGNIFICANT CHANGE UP (ref 0–0.7)
EOSINOPHIL NFR BLD AUTO: 3.8 % — SIGNIFICANT CHANGE UP (ref 0–8)
GLUCOSE SERPL-MCNC: 104 MG/DL — HIGH (ref 70–99)
HCT VFR BLD CALC: 45.9 % — SIGNIFICANT CHANGE UP (ref 42–52)
HGB BLD-MCNC: 15.9 G/DL — SIGNIFICANT CHANGE UP (ref 14–18)
IMM GRANULOCYTES NFR BLD AUTO: 0.5 % — HIGH (ref 0.1–0.3)
LYMPHOCYTES # BLD AUTO: 1.88 K/UL — SIGNIFICANT CHANGE UP (ref 1.2–3.4)
LYMPHOCYTES # BLD AUTO: 25.3 % — SIGNIFICANT CHANGE UP (ref 20.5–51.1)
MCHC RBC-ENTMCNC: 32.6 PG — HIGH (ref 27–31)
MCHC RBC-ENTMCNC: 34.6 G/DL — SIGNIFICANT CHANGE UP (ref 32–37)
MCV RBC AUTO: 94.1 FL — HIGH (ref 80–94)
MONOCYTES # BLD AUTO: 0.71 K/UL — HIGH (ref 0.1–0.6)
MONOCYTES NFR BLD AUTO: 9.6 % — HIGH (ref 1.7–9.3)
NEUTROPHILS # BLD AUTO: 4.47 K/UL — SIGNIFICANT CHANGE UP (ref 1.4–6.5)
NEUTROPHILS NFR BLD AUTO: 60.3 % — SIGNIFICANT CHANGE UP (ref 42.2–75.2)
NRBC # BLD: 0 /100 WBCS — SIGNIFICANT CHANGE UP (ref 0–0)
PLATELET # BLD AUTO: 237 K/UL — SIGNIFICANT CHANGE UP (ref 130–400)
POTASSIUM SERPL-MCNC: 4.4 MMOL/L — SIGNIFICANT CHANGE UP (ref 3.5–5)
POTASSIUM SERPL-SCNC: 4.4 MMOL/L — SIGNIFICANT CHANGE UP (ref 3.5–5)
PROT SERPL-MCNC: 7 G/DL — SIGNIFICANT CHANGE UP (ref 6–8)
RBC # BLD: 4.88 M/UL — SIGNIFICANT CHANGE UP (ref 4.7–6.1)
RBC # FLD: 11.9 % — SIGNIFICANT CHANGE UP (ref 11.5–14.5)
SODIUM SERPL-SCNC: 141 MMOL/L — SIGNIFICANT CHANGE UP (ref 135–146)
WBC # BLD: 7.42 K/UL — SIGNIFICANT CHANGE UP (ref 4.8–10.8)
WBC # FLD AUTO: 7.42 K/UL — SIGNIFICANT CHANGE UP (ref 4.8–10.8)

## 2019-10-28 PROCEDURE — 71045 X-RAY EXAM CHEST 1 VIEW: CPT | Mod: 26

## 2019-10-28 PROCEDURE — 93010 ELECTROCARDIOGRAM REPORT: CPT

## 2019-10-28 PROCEDURE — 99291 CRITICAL CARE FIRST HOUR: CPT

## 2019-10-28 RX ORDER — FAMOTIDINE 10 MG/ML
1 INJECTION INTRAVENOUS
Qty: 10 | Refills: 0
Start: 2019-10-28 | End: 2019-11-01

## 2019-10-28 RX ORDER — DIPHENHYDRAMINE HCL 50 MG
1 CAPSULE ORAL
Qty: 15 | Refills: 0
Start: 2019-10-28 | End: 2019-11-01

## 2019-10-28 RX ORDER — DIPHENHYDRAMINE HCL 50 MG
50 CAPSULE ORAL ONCE
Refills: 0 | Status: COMPLETED | OUTPATIENT
Start: 2019-10-28 | End: 2019-10-28

## 2019-10-28 RX ORDER — EPINEPHRINE 0.3 MG/.3ML
0.3 INJECTION INTRAMUSCULAR; SUBCUTANEOUS ONCE
Refills: 0 | Status: COMPLETED | OUTPATIENT
Start: 2019-10-28 | End: 2019-10-28

## 2019-10-28 RX ORDER — SODIUM CHLORIDE 9 MG/ML
1000 INJECTION INTRAMUSCULAR; INTRAVENOUS; SUBCUTANEOUS ONCE
Refills: 0 | Status: COMPLETED | OUTPATIENT
Start: 2019-10-28 | End: 2019-10-28

## 2019-10-28 RX ORDER — FAMOTIDINE 10 MG/ML
20 INJECTION INTRAVENOUS ONCE
Refills: 0 | Status: COMPLETED | OUTPATIENT
Start: 2019-10-28 | End: 2019-10-28

## 2019-10-28 RX ORDER — EPINEPHRINE 0.3 MG/.3ML
0.3 INJECTION INTRAMUSCULAR; SUBCUTANEOUS
Qty: 1 | Refills: 0
Start: 2019-10-28

## 2019-10-28 RX ADMIN — SODIUM CHLORIDE 2000 MILLILITER(S): 9 INJECTION INTRAMUSCULAR; INTRAVENOUS; SUBCUTANEOUS at 13:18

## 2019-10-28 RX ADMIN — Medication 50 MILLIGRAM(S): at 13:18

## 2019-10-28 RX ADMIN — FAMOTIDINE 20 MILLIGRAM(S): 10 INJECTION INTRAVENOUS at 13:18

## 2019-10-28 RX ADMIN — Medication 125 MILLIGRAM(S): at 13:18

## 2019-10-28 RX ADMIN — EPINEPHRINE 0.3 MILLIGRAM(S): 0.3 INJECTION INTRAMUSCULAR; SUBCUTANEOUS at 13:18

## 2019-10-28 NOTE — ED ADULT NURSE NOTE - OBJECTIVE STATEMENT
Patient complains of allergic reaction, states he ate an apple with peanut butter, no known allergy to apple or peanut butter, states he has an allergy to pesticides. Patient complains of facial swelling, throat itching and sensation of throat closing, redness to face

## 2019-10-28 NOTE — ED PROVIDER NOTE - NS ED ROS FT
Constitutional: generalized pruritis  Eyes:  No visual changes, eye pain or discharge. Periorbital swelling.   ENMT:  +generalized facial swelling. No hearing changes, pain, discharge or infections. No neck pain or stiffness.  Cardiac:  No chest pain, SOB or edema. No chest pain with exertion.  Respiratory:  No cough or respiratory distress. No hemoptysis. No history of asthma or RAD.  GI:  No nausea, vomiting, diarrhea or abdominal pain.  :  No dysuria, frequency or burning.  MS:  No myalgia, muscle weakness, joint pain or back pain.  Neuro:  No headache or weakness.  No LOC.  Skin:  Generalized erythema.   Endocrine: No history of thyroid disease or diabetes.

## 2019-10-28 NOTE — ED PROVIDER NOTE - CLINICAL SUMMARY MEDICAL DECISION MAKING FREE TEXT BOX
48 male here for food allergy. Had screening labs imaging supportive care medications and reevaluation, symptoms improved, plan discussed with patient, will discharge with outpatient management and return and follow up instructions. Found to be in anaphylaxis, was given IM epinephrine with improvement, monitored for several hours afterwards. As he takes lisinopril I instructed him on d/w his PMD regarding possible angioedema and the risks fo continuing this Rx which he understood.

## 2019-10-28 NOTE — ED PROVIDER NOTE - ATTENDING CONTRIBUTION TO CARE
I personally evaluated the patient. I reviewed the Resident’s or Physician Assistant’s note (as assigned above), and agree with the findings and plan except as documented in my note.     48 male here for suspected allergic reaction to food, ate an organic apple prior to arrival with onset of eyelid swelling, dyspnea, wheezing, generalized pruritis.    PMH: HTN   Rx: lisinopril, 7 years ago    ROS otherwise unremarkable    PE: male in no distress. CV: pulses intact. CHEST: increased work of breathing with wheezing bilaterally. ABD: nondistended. SKIN: urticaria noted. EXT: FROM. NEURO: AAO 3 no focal deficits. HEENT: eyelids swollen bilaterally left > right with lip swelling. normal phonation. no tongue swelling. NECK: normal no stridor    Impression: anaphylaxis / ACE inhibitor reaction    Plan: resuscitation, IV labs imaging supportive care and reevaluation;

## 2019-10-28 NOTE — ED PROVIDER NOTE - PATIENT PORTAL LINK FT
You can access the FollowMyHealth Patient Portal offered by [facility] by registering at the following website: [email or web address]. By joining [facility]’s FollowMyHealth portal, you will also be able to view your health information using other applications (apps) compatible with our system.

## 2019-10-28 NOTE — ED PROVIDER NOTE - PHYSICAL EXAMINATION
CONSTITUTIONAL: Well-developed; well-nourished; in no acute distress.   SKIN: warm, dry. generalized erythema.   HEAD: Normocephalic; atraumatic. lip swelling.  EYES: PERRL, EOMI, normal sclera and conjunctivea. periorbital swelling bilateral.   ENT: No nasal discharge; airway clear.  NECK: Supple; non tender.  CARD: S1, S2 normal; no murmurs, gallops, or rubs. Regular rate and rhythm.   RESP: No wheezes, rales or rhonchi.  ABD: soft ntnd  EXT: Normal ROM.  No clubbing, cyanosis or edema.   LYMPH: No acute cervical adenopathy.  NEURO: Alert, oriented, grossly unremarkable  PSYCH: Cooperative, appropriate.

## 2019-10-28 NOTE — ED ADULT NURSE NOTE - NSIMPLEMENTINTERV_GEN_ALL_ED
Implemented All Universal Safety Interventions:  [place] to call system. Call bell, personal items and telephone within reach. Instruct patient to call for assistance. Room bathroom lighting operational. Non-slip footwear when patient is off stretcher. Physically safe environment: no spills, clutter or unnecessary equipment. Stretcher in lowest position, wheels locked, appropriate side rails in place.

## 2019-10-28 NOTE — ED PROVIDER NOTE - OBJECTIVE STATEMENT
The patient is a 48 year old male with a history of hypertension presenting for allergic reaction. Patient was eating an apple today and immedietly began experiencing generalized itching, shortness of breath, The patient is a 48 year old male with a history of hypertension presenting for allergic reaction. Patient was eating an apple today and immediately began experiencing generalized pruritis, shortness of breath, eye, face, lip swelling. Patient is allergic to pesticides and thought he was eating an organic apple with no pesticides. No nausea, vomiting, diarrhea, abdominal pain. The patient is a 48 year old male with a history of hypertension presenting for allergic reaction. Patient was eating an apple today and immediately began experiencing generalized pruritis, shortness of breath, eye, face, lip swelling. Patient is allergic to pesticides and thought he was eating an organic apple with no pesticides. No nausea, vomiting, diarrhea, abdominal pain. Patient recalls having a similar reaction in his childhood.

## 2020-01-04 NOTE — CONSULT NOTE ADULT - SKIN
INTERVAL HPI/OVERNIGHT EVENTS:  No new overnight event.  No N/V/D.  Tolerating diet. abdifatah collins this am    Allergies    IV Contrast (Anaphylaxis)  shellfish. (Anaphylaxis)    Intolerances  General:  No wt loss, fevers, chills, night sweats, fatigue,   Eyes:  Good vision, no reported pain  ENT:  No sore throat, pain, runny nose, dysphagia  CV:  No pain, palpitations, hypo/hypertension  Resp:  No dyspnea, cough, tachypnea, wheezing  GI:  No pain, No nausea, No vomiting, No diarrhea, No constipation, No weight loss, No fever, No pruritis, No rectal bleeding, No tarry stools, No dysphagia,  :  No pain, bleeding, incontinence, nocturia  Muscle:  No pain, weakness  Neuro:  No weakness, tingling, memory problems  Psych:  No fatigue, insomnia, mood problems, depression  Endocrine:  No polyuria, polydipsia, cold/heat intolerance  Heme:  No petechiae, ecchymosis, easy bruisability  Skin:  No rash, tattoos, scars, edema      PHYSICAL EXAM:   Vital Signs:  Vital Signs Last 24 Hrs  T(C): 36.5 (2020 07:27), Max: 36.9 (2020 19:35)  T(F): 97.7 (2020 07:27), Max: 98.4 (2020 19:35)  HR: 76 (2020 07:27) (76 - 107)  BP: 120/80 (2020 07:27) (118/81 - 139/85)  BP(mean): 3 (2020 04:42) (3 - 3)  RR: 17 (2020 07:27) (17 - 18)  SpO2: 97% (2020 07:27) (91% - 100%)  Daily     Daily Weight in k.6 (2020 04:42)I&O's Summary    2020 07:01  -  2020 07:00  --------------------------------------------------------  IN: 0 mL / OUT: 500 mL / NET: -500 mL        GENERAL:  Appears stated age, well-groomed, well-nourished, no distress  HEENT:  NC/AT,  conjunctivae clear and pink, no thyromegaly, nodules, adenopathy, no JVD, sclera -anicteric  CHEST:  Full & symmetric excursion, no increased effort, breath sounds clear  HEART:  Regular rhythm, S1, S2, no murmur/rub/S3/S4, no abdominal bruit, no edema  ABDOMEN:  Soft, non-tender, non-distended, normoactive bowel sounds,  no masses ,no hepato-splenomegaly, no signs of chronic liver disease  EXTEREMITIES:  no cyanosis,clubbing or edema  SKIN:  No rash/erythema/ecchymoses/petechiae/wounds/abscess/warm/dry  NEURO:  Alert, oriented, no asterixis, no tremor, no encephalopathy      LABS:                        12.1   8.30  )-----------( 568      ( 2020 08:28 )             39.0     01-04    138  |  98  |  10  ----------------------------<  104<H>  4.3   |  34<H>  |  0.43<L>    Ca    9.7      2020 08:28  Mg     2.3     01-04          amylase   lipase  RADIOLOGY & ADDITIONAL TESTS: No lesions; no rash

## 2021-03-13 NOTE — ED PROVIDER NOTE - DISCUSSED CASE WITH MULTISELECT OPTIONS
Return with any fevers, vomiting, redness, swelling, worsening symptoms, or additional concerns. Follow-up with your primary care doctor or with us in 3 or 4 days if not getting better.
Consultant

## 2024-02-29 NOTE — PROGRESS NOTE ADULT - REASON FOR ADMISSION
abdominal pain/   fever---progressive   > 24 hrs duration
Admission
[facility]

## 2024-09-02 NOTE — ED PROVIDER NOTE - PROGRESS NOTE DETAILS
Dr. Coleman - likely food allergy but patient has lisinopril Rx, could be component of angioedema, will recommend cessation and he d/w his PMD which he understands.
82

## 2025-06-09 ENCOUNTER — TELEPHONE (OUTPATIENT)
Dept: FAMILY MEDICINE CLINIC | Facility: CLINIC | Age: 55
End: 2025-06-09

## 2025-06-09 NOTE — TELEPHONE ENCOUNTER
Received MRR from [facility].    Successfully faxed to [facility] and scanned into this encounter.

## 2025-06-12 NOTE — PATIENT PROFILE ADULT - ANY IMPAIRED UPPER EXTREMITY FUNCTION WITHIN A WEEK PRIOR TO ADMISSION RELATED TO?
Labs CBC, CMP, UA/Cx and EKG   Medical clearance necessary  Pre op instructions reviewed and given.   No meds to take am of surgery.   Instructed to hold and/or avoid other NSAIDs and OTC supplements. Tylenol is ok. Verbalized understanding no